# Patient Record
Sex: MALE | Race: WHITE | NOT HISPANIC OR LATINO | Employment: OTHER | URBAN - NONMETROPOLITAN AREA
[De-identification: names, ages, dates, MRNs, and addresses within clinical notes are randomized per-mention and may not be internally consistent; named-entity substitution may affect disease eponyms.]

---

## 2020-11-28 ENCOUNTER — HISTORICAL (OUTPATIENT)
Dept: ADMINISTRATIVE | Facility: HOSPITAL | Age: 70
End: 2020-11-28

## 2021-11-30 LAB
ALBUMIN SERPL-MCNC: 3.7 G/DL (ref 3.4–5)
ALBUMIN/GLOB SERPL: 1.3 {RATIO}
ALP SERPL-CCNC: 106 U/L (ref 50–144)
ALT SERPL-CCNC: 17 U/L (ref 1–45)
ANION GAP SERPL CALC-SCNC: 5 MMOL/L (ref 2–13)
AST SERPL-CCNC: 23 U/L (ref 17–59)
BASOPHILS # BLD AUTO: 0.04 10*3/UL (ref 0.01–0.08)
BASOPHILS NFR BLD AUTO: 0.7 % (ref 0.1–1.2)
BILIRUB SERPL-MCNC: 0.52 MG/DL (ref 0–1)
BUN SERPL-MCNC: 11 MG/DL (ref 7–20)
CALCIUM SERPL-MCNC: 9.3 MG/DL (ref 8.4–10.2)
CHLORIDE SERPL-SCNC: 104 MMOL/L (ref 94–110)
CO2 SERPL-SCNC: 31 MMOL/L (ref 21–32)
CREAT SERPL-MCNC: 0.84 MG/DL (ref 0.66–1.25)
CREAT/UREA NIT SERPL: 13.1 (ref 12–20)
EOSINOPHIL # BLD AUTO: 0.08 10*3/UL (ref 0.04–0.54)
EOSINOPHIL NFR BLD AUTO: 1.4 % (ref 0.7–7)
ERYTHROCYTE [DISTWIDTH] IN BLOOD BY AUTOMATED COUNT: 12.4 % (ref 11.6–14.4)
GLOBULIN SER-MCNC: 2.9 G/DL (ref 2–3.9)
GLUCOSE SERPL-MCNC: 153 MGM./DL (ref 70–115)
HCT VFR BLD AUTO: 42.5 % (ref 36–52)
HGB BLD-MCNC: 14.2 G/DL (ref 13–18)
IMM GRANULOCYTES # BLD AUTO: 0.01 10*3/UL (ref 0–0.03)
IMM GRANULOCYTES NFR BLD AUTO: 0.2 % (ref 0–0.5)
LYMPHOCYTES # BLD AUTO: 2.03 10*3/UL (ref 1.32–3.57)
LYMPHOCYTES NFR BLD AUTO: 35.4 % (ref 20–55)
MCH RBC QN AUTO: 29.3 PG (ref 27–34)
MCHC RBC AUTO-ENTMCNC: 33.4 G/DL (ref 31–37)
MCV RBC AUTO: 87.6 FL (ref 79–99)
MONOCYTES # BLD AUTO: 0.44 10*3/UL (ref 0.3–0.82)
MONOCYTES NFR BLD AUTO: 7.7 % (ref 4.7–12.5)
NEUTROPHILS # BLD AUTO: 3.14 10*3/UL (ref 1.78–5.38)
NEUTROPHILS NFR BLD AUTO: 54.6 % (ref 37–73)
PLATELET # BLD AUTO: 180 10*3/UL (ref 140–371)
PMV BLD AUTO: 12.3 FL (ref 9.4–12.4)
POTASSIUM SERPL-SCNC: 4.2 MMOL/L (ref 3.5–5.1)
PROT SERPL-MCNC: 6.6 G/DL (ref 6.3–8.2)
RBC # BLD AUTO: 4.85 10*6/UL (ref 4–6)
SODIUM SERPL-SCNC: 140 MMOL/L (ref 135–145)
WBC # SPEC AUTO: 5.7 10*3/UL (ref 4–11.5)

## 2021-12-16 LAB — CRC RECOMMENDATION EXT: NORMAL

## 2021-12-29 LAB
EST. AVERAGE GLUCOSE BLD GHB EST-MCNC: 210 MG/DL (ref 70–115)
HBA1C MFR BLD: 9 % (ref 4–6)

## 2022-04-07 ENCOUNTER — HISTORICAL (OUTPATIENT)
Dept: ADMINISTRATIVE | Facility: HOSPITAL | Age: 72
End: 2022-04-07

## 2022-04-23 VITALS
WEIGHT: 250 LBS | OXYGEN SATURATION: 98 % | SYSTOLIC BLOOD PRESSURE: 170 MMHG | BODY MASS INDEX: 37.89 KG/M2 | HEIGHT: 68 IN | DIASTOLIC BLOOD PRESSURE: 80 MMHG

## 2022-04-27 ENCOUNTER — HISTORICAL (OUTPATIENT)
Dept: ADMINISTRATIVE | Facility: HOSPITAL | Age: 72
End: 2022-04-27

## 2022-07-13 LAB
LEFT EYE DM RETINOPATHY: POSITIVE
RIGHT EYE DM RETINOPATHY: POSITIVE

## 2023-01-31 ENCOUNTER — DOCUMENTATION ONLY (OUTPATIENT)
Dept: ADMINISTRATIVE | Facility: HOSPITAL | Age: 73
End: 2023-01-31
Payer: MEDICARE

## 2023-04-18 ENCOUNTER — LAB VISIT (OUTPATIENT)
Dept: LAB | Facility: HOSPITAL | Age: 73
End: 2023-04-18
Attending: NURSE PRACTITIONER
Payer: MEDICARE

## 2023-04-18 DIAGNOSIS — E11.00 TYPE II DIABETES MELLITUS WITH HYPEROSMOLARITY, UNCONTROLLED: Primary | ICD-10-CM

## 2023-04-18 DIAGNOSIS — Z79.4 ENCOUNTER FOR LONG-TERM (CURRENT) USE OF INSULIN: ICD-10-CM

## 2023-04-18 DIAGNOSIS — I10 ESSENTIAL HYPERTENSION, MALIGNANT: ICD-10-CM

## 2023-04-18 DIAGNOSIS — E16.2 HYPOGLYCEMIA, UNSPECIFIED: ICD-10-CM

## 2023-04-18 LAB
ALBUMIN SERPL-MCNC: 4 G/DL (ref 3.4–5)
ALBUMIN/GLOB SERPL: 1.5 RATIO
ALP SERPL-CCNC: 126 UNIT/L (ref 50–144)
ALT SERPL-CCNC: 22 UNIT/L (ref 1–45)
ANION GAP SERPL CALC-SCNC: 4 MEQ/L (ref 2–13)
AST SERPL-CCNC: 25 UNIT/L (ref 17–59)
BILIRUBIN DIRECT+TOT PNL SERPL-MCNC: 0.5 MG/DL (ref 0–1)
BUN SERPL-MCNC: 13 MG/DL (ref 7–20)
CALCIUM SERPL-MCNC: 8.8 MG/DL (ref 8.4–10.2)
CHLORIDE SERPL-SCNC: 108 MMOL/L (ref 98–110)
CO2 SERPL-SCNC: 30 MMOL/L (ref 21–32)
CREAT SERPL-MCNC: 0.88 MG/DL (ref 0.66–1.25)
CREAT/UREA NIT SERPL: 15 (ref 12–20)
EST. AVERAGE GLUCOSE BLD GHB EST-MCNC: 157.1 MG/DL (ref 70–115)
GFR SERPLBLD CREATININE-BSD FMLA CKD-EPI: >90 MLS/MIN/1.73/M2
GLOBULIN SER-MCNC: 2.6 GM/DL (ref 2–3.9)
GLUCOSE SERPL-MCNC: 61 MG/DL (ref 70–115)
HBA1C MFR BLD: 7.1 % (ref 4–6)
POTASSIUM SERPL-SCNC: 4 MMOL/L (ref 3.5–5.1)
PROT SERPL-MCNC: 6.6 GM/DL (ref 6.3–8.2)
SODIUM SERPL-SCNC: 142 MMOL/L (ref 135–145)

## 2023-04-18 PROCEDURE — 80053 COMPREHEN METABOLIC PANEL: CPT

## 2023-04-18 PROCEDURE — 36415 COLL VENOUS BLD VENIPUNCTURE: CPT

## 2023-04-18 PROCEDURE — 83036 HEMOGLOBIN GLYCOSYLATED A1C: CPT

## 2023-06-22 VITALS
OXYGEN SATURATION: 96 % | DIASTOLIC BLOOD PRESSURE: 70 MMHG | BODY MASS INDEX: 35.95 KG/M2 | HEIGHT: 68 IN | SYSTOLIC BLOOD PRESSURE: 144 MMHG | HEART RATE: 90 BPM | WEIGHT: 237.19 LBS

## 2023-06-22 PROCEDURE — 83036 HEMOGLOBIN GLYCOSYLATED A1C: CPT | Performed by: NURSE PRACTITIONER

## 2023-06-22 PROCEDURE — 84443 ASSAY THYROID STIM HORMONE: CPT | Performed by: NURSE PRACTITIONER

## 2023-06-22 PROCEDURE — 80061 LIPID PANEL: CPT | Performed by: NURSE PRACTITIONER

## 2023-06-22 PROCEDURE — 85027 COMPLETE CBC AUTOMATED: CPT | Performed by: NURSE PRACTITIONER

## 2023-06-22 PROCEDURE — 80053 COMPREHEN METABOLIC PANEL: CPT | Performed by: NURSE PRACTITIONER

## 2023-06-28 ENCOUNTER — OFFICE VISIT (OUTPATIENT)
Dept: FAMILY MEDICINE | Facility: CLINIC | Age: 73
End: 2023-06-28
Payer: MEDICARE

## 2023-06-28 VITALS
HEART RATE: 56 BPM | WEIGHT: 236.19 LBS | SYSTOLIC BLOOD PRESSURE: 138 MMHG | BODY MASS INDEX: 35.79 KG/M2 | TEMPERATURE: 98 F | HEIGHT: 68 IN | DIASTOLIC BLOOD PRESSURE: 70 MMHG | OXYGEN SATURATION: 98 %

## 2023-06-28 DIAGNOSIS — Z12.5 ENCOUNTER FOR SCREENING FOR MALIGNANT NEOPLASM OF PROSTATE: ICD-10-CM

## 2023-06-28 DIAGNOSIS — Z79.4 TYPE 2 DIABETES MELLITUS WITHOUT COMPLICATION, WITH LONG-TERM CURRENT USE OF INSULIN: ICD-10-CM

## 2023-06-28 DIAGNOSIS — D51.0 PERNICIOUS ANEMIA: ICD-10-CM

## 2023-06-28 DIAGNOSIS — E11.9 TYPE 2 DIABETES MELLITUS WITHOUT COMPLICATION, WITH LONG-TERM CURRENT USE OF INSULIN: ICD-10-CM

## 2023-06-28 DIAGNOSIS — Z00.00 MEDICARE ANNUAL WELLNESS VISIT, SUBSEQUENT: Primary | ICD-10-CM

## 2023-06-28 DIAGNOSIS — E78.00 HYPERCHOLESTEROLEMIA: ICD-10-CM

## 2023-06-28 PROBLEM — Z80.0 FAMILY HISTORY OF MALIGNANT NEOPLASM OF COLON: Status: ACTIVE | Noted: 2023-06-28

## 2023-06-28 PROBLEM — Z87.891 FORMER SMOKER: Status: ACTIVE | Noted: 2023-06-28

## 2023-06-28 PROBLEM — I10 HYPERTENSION: Status: ACTIVE | Noted: 2023-06-28

## 2023-06-28 PROBLEM — G25.0 ESSENTIAL TREMOR: Status: ACTIVE | Noted: 2023-06-28

## 2023-06-28 PROCEDURE — G0439 PPPS, SUBSEQ VISIT: HCPCS | Mod: ,,, | Performed by: NURSE PRACTITIONER

## 2023-06-28 PROCEDURE — G0439 PR MEDICARE ANNUAL WELLNESS SUBSEQUENT VISIT: ICD-10-PCS | Mod: ,,, | Performed by: NURSE PRACTITIONER

## 2023-06-28 RX ORDER — INSULIN DEGLUDEC 200 U/ML
160 INJECTION, SOLUTION SUBCUTANEOUS DAILY
COMMUNITY
Start: 2021-09-01 | End: 2023-06-28 | Stop reason: SDUPTHER

## 2023-06-28 RX ORDER — INSULIN ASPART 100 [IU]/ML
50 INJECTION, SOLUTION INTRAVENOUS; SUBCUTANEOUS 3 TIMES DAILY
COMMUNITY
Start: 2010-03-01

## 2023-06-28 RX ORDER — ROSUVASTATIN CALCIUM 20 MG/1
20 TABLET, COATED ORAL DAILY
COMMUNITY
Start: 2023-05-23 | End: 2024-02-08

## 2023-06-28 RX ORDER — LISINOPRIL 20 MG/1
20 TABLET ORAL DAILY
COMMUNITY
Start: 2022-06-01 | End: 2024-02-08

## 2023-06-28 RX ORDER — DAPAGLIFLOZIN 10 MG/1
10 TABLET, FILM COATED ORAL DAILY
COMMUNITY
Start: 2022-03-01 | End: 2024-02-08

## 2023-06-28 RX ORDER — INSULIN DEGLUDEC 200 U/ML
120 INJECTION, SOLUTION SUBCUTANEOUS DAILY
Qty: 6 PEN | Refills: 11 | Status: SHIPPED | OUTPATIENT
Start: 2023-06-28

## 2023-06-28 NOTE — PROGRESS NOTES
Patient ID: González Soni  : 1950    Chief Complaint: Medicare AWV      History of Present Illness:  The patient is a 73 y.o. White male who presents to clinic for annual wellness visit.      Doing well in general. He remains active in life and tolerates activity without complaint.     Very compliant with his diabetes management; established with endocrinology. His A1c is now 7.2%.     Denies changes in bowel patterns, no melena, hematochezia, rectal pain, rectal bleeding, or changes in caliber of stool. Colonoscopy done 2021. Recommendation to repeat Q5 years due to fly hx colon cancer.   Denies issues with urination; no hesitancy, frequency, difficulty with start or stop of stream.        Allergies: Patient has No Known Allergies.     Past Medical History:  has a past medical history of Diabetes mellitus, type 2, Essential tremor, Family history of colon cancer, Hypercholesterolemia, Hypertension, and Pernicious anemia.    Surgical History:  has a past surgical history that includes Colonoscopy (2021); Cataract extraction (Right, 2021); Cataract extraction (Left, 2021); retina rupture repair (Left, ); Eye surgery (); and Fracture surgery ().    Family History: family history includes Cancer in his father, maternal grandfather, and mother; Colon cancer in his mother; Diabetes in his mother; Heart disease in his father; Prostate cancer in his father.    Social History:  reports that he quit smoking about 9 years ago. His smoking use included cigarettes, pipe, and cigars. He started smoking about 48 years ago. He has a 15.00 pack-year smoking history. He has never used smokeless tobacco. He reports that he does not currently use alcohol after a past usage of about 3.0 standard drinks per week. He reports that he does not use drugs.    Care Team: Patient Care Team:  MIRANDA Quick as PCP - General (Family Medicine)     Current Medications:  Current  Outpatient Medications   Medication Instructions    dapagliflozin propanediol (FARXIGA) 10 mg, Oral, Daily    insulin aspart U-100 (NOVOLOG FLEXPEN U-100 INSULIN) 50 Units, Subcutaneous, 3 times daily    insulin degludec (TRESIBA FLEXTOUCH U-200) 120 Units, Subcutaneous, Daily    lisinopriL (PRINIVIL,ZESTRIL) 20 mg, Oral, Daily    rosuvastatin (CRESTOR) 20 mg, Oral, Daily       Opioid Screening: Patient medication list reviewed, patient is not taking prescription opioids. Patient is not using additional opioids than prescribed. Patient is at low risk of substance abuse based on this opioid use history.     Patient Reported Health Risk Assessment  What [age] 70-79  Are you male or female?: Male  During the past four weeks, how much have you been bothered by emotional problems such as feeling anxious, depressed, irritable, sad, or downhearted and blue?: Not at all  During the past five weeks, has your physical and/or emotional health limited your social activities with family, friends, neighbors, or groups?: Not at all  During the past four weeks, how much bodily pain have you generally had?: Mild pain  During the past four weeks, was someone available to help if you needed and wanted help?: Yes, as much as I wanted  During the past four weeks, what was the hardest physical activity you could do for at least two minutes?: Heavy  Can you get to places out of walking distance without help?  (For example, can you travel alone on buses or taxis, or drive your own car?): Yes  Can you go shopping for groceries or clothes without someone's help?: Yes  Can you prepare your own meals?: Yes  Can you do your own housework without help?: Yes  Because of any health problems, do you need the help of another person with your personal care needs such as eating, bathing, dressing, or getting around the house?: No  Can you handle your own money without help?: Yes  During the past four weeks, how would you rate your health in  general?: Very good  How have things been going for you during the past four weeks?: Very well  Are you having difficulties driving your car?: No  Do you always fasten your seat belt when you are in a car?: Yes, usually  How often in the past four weeks have you been bothered by falling or dizzy when standing up?: Never  How often in the past four weeks have you been bothered by sexual problems?: Never  How often in the past four weeks have you been bothered by trouble eating well?: Never  How often in the past four weeks have you been bothered by teeth or denture problems?: Never  How often in the past four weeks have you been bothered with problems using the telephone?: Never  How often in the past four weeks have you been bothered by tiredness or fatigue?: Sometimes  Have you fallen two or more times in the past year?: No  Are you afraid of falling?: No  Are you a smoker?: No  During the past four weeks, how many drinks of wine, beer, or other alcoholic beverages did you have?: No alcohol at all  Do you exercise for about 20 minutes three or more days a week?: Yes, most of the time  Have you been given any information to help you with hazards in your house that might hurt you?: Yes  Have you been given any information to help you with keeping track of your medications?: Yes  How often do you have trouble taking medicines the way you've been told to take them?: I always take them as prescribed  How confident are you that you can control and manage most of your health problems?: Very confident  What is your race? (Check all that apply.):     Review of Systems   Constitutional:  Negative for activity change, appetite change, fatigue and unexpected weight change.   HENT:  Negative for ear pain and hearing loss.    Eyes:  Negative for visual disturbance.   Respiratory:  Negative for apnea, cough, chest tightness, shortness of breath and wheezing.    Cardiovascular:  Negative for chest pain, palpitations, leg  "swelling and claudication.   Gastrointestinal:  Negative for abdominal pain, anal bleeding, blood in stool, change in bowel habit, nausea, vomiting, reflux and change in bowel habit.   Endocrine: Negative for cold intolerance, heat intolerance, polydipsia, polyphagia and polyuria.   Genitourinary:  Negative for dysuria, frequency, hematuria and urgency.   Musculoskeletal:  Negative for arthralgias.   Integumentary:  Negative for rash and mole/lesion.   Allergic/Immunologic: Negative for environmental allergies.   Neurological:  Negative for dizziness, headaches and memory loss.        Visit Vitals  /70 (BP Location: Left arm, Patient Position: Sitting)   Pulse (!) 56   Temp 97.7 °F (36.5 °C) (Oral)   Ht 5' 7.72" (1.72 m)   Wt 107.1 kg (236 lb 3.2 oz)   SpO2 98%   BMI 36.21 kg/m²       Physical Exam  Vitals reviewed.   Constitutional:       Appearance: Normal appearance. He is normal weight.   HENT:      Right Ear: Tympanic membrane and ear canal normal.      Left Ear: Tympanic membrane and ear canal normal.   Eyes:      Conjunctiva/sclera: Conjunctivae normal.   Cardiovascular:      Rate and Rhythm: Normal rate and regular rhythm.      Pulses: Normal pulses.      Heart sounds: Normal heart sounds.   Pulmonary:      Effort: Pulmonary effort is normal.      Breath sounds: Normal breath sounds.   Abdominal:      General: Bowel sounds are normal.      Palpations: Abdomen is soft.   Musculoskeletal:         General: Normal range of motion.      Cervical back: Normal range of motion and neck supple.      Right lower leg: Edema (mild) present.      Left lower leg: Edema (mild) present.   Skin:     General: Skin is warm and dry.      Capillary Refill: Capillary refill takes less than 2 seconds.      Comments: +mild varicose veins bilateral legs.    Neurological:      Mental Status: He is alert and oriented to person, place, and time.   Psychiatric:         Mood and Affect: Mood normal.         Behavior: Behavior " normal.        No flowsheet data found.  No flowsheet data found.        Depression Screening  Over the past two weeks, has the patient felt down, depressed, or hopeless?: No  Over the past two weeks, has the patient felt little interest or pleasure in doing things?: No  Functional Ability/Safety Screening  Was the patient's timed Up & Go test unsteady or longer than 30 seconds?: No  Does the patient need help with phone, transportation, shopping, preparing meals, housework, laundry, meds, or managing money?: No  Does the patient's home have rugs in the hallway, lack grab bars in the bathroom, lack handrails on the stairs or have poor lighting?: No  Have you noticed any hearing difficulties?: No  Cognitive Function (Assessed through direct observation with due consideration of information obtained by way of patient reports and/or concerns raised by family, friends, caretakers, or others)    Does the patient repeat questions/statements in the same day?: No  Does the patient have trouble remembering the date, year, and time?: No  Does the patient have difficulty managing finances?: No  Does the patient have a decreased sense of direction?: No    Labs Reviewed:  Chemistry:  Lab Results   Component Value Date     06/22/2023    K 3.7 06/22/2023    CHLORIDE 105 06/22/2023    BUN 9.0 06/22/2023    CREATININE 0.92 06/22/2023    EGFRNORACEVR 88 06/22/2023    GLUCOSE 70 06/22/2023    CALCIUM 8.2 (L) 06/22/2023    ALKPHOS 156 (H) 06/22/2023    LABPROT 6.2 (L) 06/22/2023    ALBUMIN 3.6 06/22/2023    AST 39 06/22/2023    ALT 38 06/22/2023    TSH 2.310 06/22/2023        Lab Results   Component Value Date    HGBA1C 7.2 (H) 06/22/2023        Hematology:  Lab Results   Component Value Date    WBC 6.94 06/22/2023    RBC 4.80 06/22/2023    HGB 14.4 06/22/2023    HCT 41.5 06/22/2023    MCV 86.5 06/22/2023    MCH 30.0 06/22/2023    MCHC 34.7 06/22/2023    RDW 12.7 06/22/2023     06/22/2023    MPV 11.6 06/22/2023       Lipid  Panel:  Lab Results   Component Value Date    CHOL 68 06/22/2023    HDL 24 (L) 06/22/2023    DLDL <30.0 (L) 06/22/2023    TRIG 99 06/22/2023        Assessment & Plan:  1. Medicare annual wellness visit, subsequent  -     CBC Auto Differential; Future; Expected date: 06/28/2024  -     Comprehensive Metabolic Panel; Future; Expected date: 06/28/2024  -     Lipid Panel; Future; Expected date: 06/28/2024  -     TSH; Future; Expected date: 06/28/2024  -     Hemoglobin A1C; Future; Expected date: 06/28/2024  -     Microalbumin/Creatinine Ratio, Urine; Future; Expected date: 06/28/2024  -     PSA, Screening; Future; Expected date: 06/28/2024    2. Type 2 diabetes mellitus without complication, with long-term current use of insulin  Overview:  On Farxiga, Tresiba, and Novolin @ mealtime > per Endocrinology    Diabetes labs:   Lab Results   Component Value Date    HGBA1C 7.2 (H) 06/22/2023   Continue medications per Endocrinology.  Take all medications as directed; compliance is critical for managing your diabetes.   Follow American Diabetic Association (ADA) dietary guidelines for eating and implement exercise into your daily regimen.   Check your glucose levels each morning and record for review at follow up.    3. Hypercholesterolemia  Overview:  On Rosuvastatin 20 mg daily.  Lipid Panel:  Lab Results   Component Value Date    CHOL 68 06/22/2023    HDL 24 (L) 06/22/2023    DLDL <30.0 (L) 06/22/2023    TRIG 99 06/22/2023     Continue Rosuvastatin 20 mg daily.   LDL Goal: 100  Educated on dietary modifications. Follow a low cholesterol, low saturated fat diet with less that 200mg of cholesterol a day.  Avoid fried foods and high saturated fats.  Increase dietary fiber.  Regular exercise can reduce LDL (bad cholesterol) and raise HDL (good cholesterol). Encourage physical activity 5 times per week for 30 minutes per day.     4. Pernicious anemia  H&H 14.4/41.5    5. Encounter for screening for malignant neoplasm of  prostate  -     PSA, Screening; Future; Expected date: 06/28/2024           Colon Cancer Screening -  Colonoscopy done 12/2021. Recommendation to repeat Q5 years due to fly hx colon cancer. Due 2026.  Eye Exam- Recommend annually.  Dental Exam- Recommend biannually.    Vaccinations:  Immunization History   Administered Date(s) Administered    COVID-19 Vaccine 01/27/2021, 02/26/2021, 10/25/2021, 05/24/2022    COVID-19, MRNA, LN-S, PF (MODERNA FULL 0.5 ML DOSE) 01/27/2021, 02/26/2021, 10/25/2021, 05/24/2022    COVID-19, mRNA, LNP-S, bivalent booster, PF (PFIZER OMICRON) 10/05/2022    Influenza - Quadrivalent - High Dose - PF (65 years and older) 09/28/2020, 10/06/2021, 10/05/2022    Pneumococcal Conjugate - 13 Valent 10/26/2021    Pneumococcal Polysaccharide - 23 Valent 12/20/2022    Tdap 11/28/2020       Future Appointments   Date Time Provider Department Center   6/25/2024  8:00 AM LAB, [facility] LABORATORY DRAW STATION [facility] [place]   7/2/2024  9:00 [facility] [place]         Follow up in about 1 year (around 6/28/2024), or if symptoms worsen or fail to improve, for wellness, labs prior. Call sooner if needed.    MIRANDA Santos        Medicare Annual Wellness and Personalized Prevention Plan:   Fall Risk + Home Safety + Hearing Impairment + Depression Screen + Opioid and Substance Abuse Screening + Cognitive Impairment Screen + Health Risk Assessment all reviewed.     Health Maintenance Topics with due status: Not Due       Topic Last Completion Date    TETANUS VACCINE 11/28/2020    Colorectal Cancer Screening 12/16/2021    Low Dose Statin 05/23/2023    Lipid Panel 06/22/2023    Hemoglobin A1c 06/22/2023      The patient's Health Maintenance was reviewed and the following appears to be due at this time:   Health Maintenance Due   Topic Date Due    Hepatitis C Screening  Never done    Diabetes Urine Screening  Never done    Foot Exam  Never done    Shingles Vaccine (1  of 2) Never done    Abdominal Aortic Aneurysm Screening  Never done    Eye Exam  07/13/2023       Advance Care Planning   I attest to discussing Advance Care Planning with patient and/or family member.  Education was provided including the importance of the Health Care Power of , Advance Directives, and/or [place] documentation.  The patient expressed understanding to the importance of this information and discussion.       Follow up in about 1 year (around 6/28/2024), or if symptoms worsen or fail to improve, for wellness, labs prior. In addition to their scheduled follow up, the patient has also been instructed to follow up on as needed basis.

## 2023-08-15 ENCOUNTER — LAB VISIT (OUTPATIENT)
Dept: LAB | Facility: HOSPITAL | Age: 73
End: 2023-08-15
Attending: INTERNAL MEDICINE
Payer: MEDICARE

## 2023-08-15 DIAGNOSIS — E11.00 TYPE II DIABETES MELLITUS WITH HYPEROSMOLARITY, UNCONTROLLED: Primary | ICD-10-CM

## 2023-08-15 DIAGNOSIS — E16.2 HYPOGLYCEMIA, UNSPECIFIED: ICD-10-CM

## 2023-08-15 DIAGNOSIS — E78.5 HYPERLIPIDEMIA, UNSPECIFIED HYPERLIPIDEMIA TYPE: ICD-10-CM

## 2023-08-15 LAB
ALBUMIN SERPL-MCNC: 4.3 G/DL (ref 3.4–5)
ALBUMIN/GLOB SERPL: 1.5 RATIO
ALP SERPL-CCNC: 130 UNIT/L (ref 50–144)
ALT SERPL-CCNC: 19 UNIT/L (ref 1–45)
ANION GAP SERPL CALC-SCNC: 9 MEQ/L (ref 2–13)
AST SERPL-CCNC: 22 UNIT/L (ref 17–59)
BILIRUB SERPL-MCNC: 0.6 MG/DL (ref 0–1)
BUN SERPL-MCNC: 19 MG/DL (ref 7–20)
CALCIUM SERPL-MCNC: 9.7 MG/DL (ref 8.4–10.2)
CHLORIDE SERPL-SCNC: 101 MMOL/L (ref 98–110)
CHOLEST SERPL-MCNC: 178 MG/DL (ref 0–200)
CO2 SERPL-SCNC: 27 MMOL/L (ref 21–32)
CREAT SERPL-MCNC: 1.01 MG/DL (ref 0.66–1.25)
CREAT/UREA NIT SERPL: 19 (ref 12–20)
EST. AVERAGE GLUCOSE BLD GHB EST-MCNC: 185.8 MG/DL (ref 70–115)
GFR SERPLBLD CREATININE-BSD FMLA CKD-EPI: 79 MLS/MIN/1.73/M2
GLOBULIN SER-MCNC: 2.9 GM/DL (ref 2–3.9)
GLUCOSE SERPL-MCNC: 86 MG/DL (ref 70–115)
HBA1C MFR BLD: 8.1 % (ref 4–6)
HDLC SERPL-MCNC: 46 MG/DL (ref 40–60)
LDLC SERPL DIRECT ASSAY-SCNC: 97.4 MG/DL (ref 30–100)
POTASSIUM SERPL-SCNC: 5 MMOL/L (ref 3.5–5.1)
PROT SERPL-MCNC: 7.2 GM/DL (ref 6.3–8.2)
SODIUM SERPL-SCNC: 137 MMOL/L (ref 135–145)
TRIGL SERPL-MCNC: 116 MG/DL (ref 30–200)

## 2023-08-15 PROCEDURE — 80061 LIPID PANEL: CPT

## 2023-08-15 PROCEDURE — 36415 COLL VENOUS BLD VENIPUNCTURE: CPT

## 2023-08-15 PROCEDURE — 83036 HEMOGLOBIN GLYCOSYLATED A1C: CPT

## 2023-08-15 PROCEDURE — 80053 COMPREHEN METABOLIC PANEL: CPT

## 2023-08-17 DIAGNOSIS — Z79.4 TYPE 2 DIABETES MELLITUS WITHOUT COMPLICATION, WITH LONG-TERM CURRENT USE OF INSULIN: Primary | ICD-10-CM

## 2023-08-17 DIAGNOSIS — E11.9 TYPE 2 DIABETES MELLITUS WITHOUT COMPLICATION, WITH LONG-TERM CURRENT USE OF INSULIN: Primary | ICD-10-CM

## 2023-08-17 RX ORDER — BLOOD SUGAR DIAGNOSTIC
STRIP MISCELLANEOUS
Qty: 150 STRIP | Refills: 11 | Status: SHIPPED | OUTPATIENT
Start: 2023-08-17

## 2024-02-08 DIAGNOSIS — I10 HYPERTENSION, UNSPECIFIED TYPE: ICD-10-CM

## 2024-02-08 DIAGNOSIS — Z79.4 TYPE 2 DIABETES MELLITUS WITHOUT COMPLICATION, WITH LONG-TERM CURRENT USE OF INSULIN: ICD-10-CM

## 2024-02-08 DIAGNOSIS — E78.00 HYPERCHOLESTEROLEMIA: Primary | ICD-10-CM

## 2024-02-08 DIAGNOSIS — E11.9 TYPE 2 DIABETES MELLITUS WITHOUT COMPLICATION, WITH LONG-TERM CURRENT USE OF INSULIN: ICD-10-CM

## 2024-02-08 RX ORDER — DAPAGLIFLOZIN 10 MG/1
10 TABLET, FILM COATED ORAL EVERY MORNING
Qty: 90 TABLET | Refills: 3 | Status: SHIPPED | OUTPATIENT
Start: 2024-02-08

## 2024-02-08 RX ORDER — ROSUVASTATIN CALCIUM 20 MG/1
20 TABLET, COATED ORAL
Qty: 90 TABLET | Refills: 3 | Status: SHIPPED | OUTPATIENT
Start: 2024-02-08

## 2024-02-08 RX ORDER — LISINOPRIL 20 MG/1
20 TABLET ORAL
Qty: 90 TABLET | Refills: 3 | Status: SHIPPED | OUTPATIENT
Start: 2024-02-08

## 2024-02-14 ENCOUNTER — LAB VISIT (OUTPATIENT)
Dept: LAB | Facility: HOSPITAL | Age: 74
End: 2024-02-14
Attending: INTERNAL MEDICINE
Payer: MEDICARE

## 2024-02-14 DIAGNOSIS — E11.00 TYPE II DIABETES MELLITUS WITH HYPEROSMOLARITY, UNCONTROLLED: Primary | ICD-10-CM

## 2024-02-14 DIAGNOSIS — Z79.4 ENCOUNTER FOR LONG-TERM (CURRENT) USE OF INSULIN: ICD-10-CM

## 2024-02-14 DIAGNOSIS — E16.2 HYPOGLYCEMIA, UNSPECIFIED: ICD-10-CM

## 2024-02-14 LAB
ALBUMIN SERPL-MCNC: 4.1 G/DL (ref 3.4–5)
ALBUMIN/GLOB SERPL: 1.3 RATIO
ALP SERPL-CCNC: 124 UNIT/L (ref 50–144)
ALT SERPL-CCNC: 28 UNIT/L (ref 1–45)
ANION GAP SERPL CALC-SCNC: 7 MEQ/L (ref 2–13)
AST SERPL-CCNC: 30 UNIT/L (ref 17–59)
BILIRUB SERPL-MCNC: 0.7 MG/DL (ref 0–1)
BUN SERPL-MCNC: 13 MG/DL (ref 7–20)
CALCIUM SERPL-MCNC: 9.5 MG/DL (ref 8.4–10.2)
CHLORIDE SERPL-SCNC: 105 MMOL/L (ref 98–110)
CHOLEST SERPL-MCNC: 154 MG/DL (ref 0–200)
CO2 SERPL-SCNC: 29 MMOL/L (ref 21–32)
CREAT SERPL-MCNC: 0.99 MG/DL (ref 0.66–1.25)
CREAT UR-MCNC: 63.5 MG/DL (ref 63–166)
CREAT/UREA NIT SERPL: 13 (ref 12–20)
EST. AVERAGE GLUCOSE BLD GHB EST-MCNC: 211.6 MG/DL (ref 70–115)
GFR SERPLBLD CREATININE-BSD FMLA CKD-EPI: 80 MLS/MIN/1.73/M2
GLOBULIN SER-MCNC: 3.2 GM/DL (ref 2–3.9)
GLUCOSE SERPL-MCNC: 141 MG/DL (ref 70–115)
HBA1C MFR BLD: 9 % (ref 4–6)
HDLC SERPL-MCNC: 44 MG/DL (ref 40–60)
LDLC SERPL DIRECT ASSAY-SCNC: 81.3 MG/DL (ref 30–100)
MAGNESIUM SERPL-MCNC: 2.3 MG/DL (ref 1.8–2.4)
MICROALBUMIN UR-MCNC: <5 UG/ML
MICROALBUMIN/CREAT RATIO PNL UR: NORMAL
POTASSIUM SERPL-SCNC: 4.4 MMOL/L (ref 3.5–5.1)
PROT SERPL-MCNC: 7.3 GM/DL (ref 6.3–8.2)
SODIUM SERPL-SCNC: 141 MMOL/L (ref 135–145)
T4 FREE SERPL-MCNC: 0.98 NG/DL (ref 0.78–2.19)
TRIGL SERPL-MCNC: 138 MG/DL (ref 30–200)
TSH SERPL-ACNC: 2.95 UIU/ML (ref 0.36–3.74)

## 2024-02-14 PROCEDURE — 83735 ASSAY OF MAGNESIUM: CPT

## 2024-02-14 PROCEDURE — 84439 ASSAY OF FREE THYROXINE: CPT

## 2024-02-14 PROCEDURE — 36415 COLL VENOUS BLD VENIPUNCTURE: CPT

## 2024-02-14 PROCEDURE — 84443 ASSAY THYROID STIM HORMONE: CPT

## 2024-02-14 PROCEDURE — 83036 HEMOGLOBIN GLYCOSYLATED A1C: CPT

## 2024-02-14 PROCEDURE — 80053 COMPREHEN METABOLIC PANEL: CPT

## 2024-02-14 PROCEDURE — 80061 LIPID PANEL: CPT

## 2024-02-14 PROCEDURE — 82043 UR ALBUMIN QUANTITATIVE: CPT

## 2024-05-15 LAB
LEFT EYE DM RETINOPATHY: POSITIVE
RIGHT EYE DM RETINOPATHY: POSITIVE

## 2024-06-04 ENCOUNTER — PATIENT OUTREACH (OUTPATIENT)
Facility: CLINIC | Age: 74
End: 2024-06-04
Payer: MEDICARE

## 2024-06-04 NOTE — PROGRESS NOTES
Health Maintenance Topic(s) Outreach Outcomes & Actions Taken:    Eye Exam - Outreach Outcomes & Actions Taken  : External Records Requested & Care Team Updated if Applicable

## 2024-06-04 NOTE — LETTER
AUTHORIZATION FOR RELEASE OF   CONFIDENTIAL INFORMATION    Dear Dr. Chinchilla,    Fax: [phone]    We are seeing González Soni, date of birth 1950, in the clinic at [facility]. Naina Spear, MIRANDA is the patient's PCP. González Soni has an outstanding lab/procedure at the time we reviewed his chart. In order to help keep his health information updated, he has authorized us to request the following medical record(s):        (  )  MAMMOGRAM                                      (  )  COLONOSCOPY      (  )  PAP SMEAR                                          (  )  OUTSIDE LAB RESULTS     (  )  DEXA SCAN                                          ( x )  DIABETIC EYE EXAM            (  )  FOOT EXAM                                          (  )  ENTIRE RECORD     (  )  OUTSIDE IMMUNIZATIONS                 (  )  _______________         Please fax records to [facility], [place], Deana L., BERRY-C, [phone].            Patient Name: González Soni  : 1950  Patient Phone #: [phone]

## 2024-06-17 NOTE — PROGRESS NOTES
Records Received, hyper-linked into chart at this time. The following record(s)  below were uploaded for Health Maintenance .             5/15/2024 DM EYE EXAM

## 2024-06-25 PROCEDURE — 84153 ASSAY OF PSA TOTAL: CPT | Performed by: NURSE PRACTITIONER

## 2024-06-25 PROCEDURE — 82043 UR ALBUMIN QUANTITATIVE: CPT | Performed by: NURSE PRACTITIONER

## 2024-06-25 PROCEDURE — 80061 LIPID PANEL: CPT | Performed by: NURSE PRACTITIONER

## 2024-06-25 PROCEDURE — 84443 ASSAY THYROID STIM HORMONE: CPT | Performed by: NURSE PRACTITIONER

## 2024-06-25 PROCEDURE — 80053 COMPREHEN METABOLIC PANEL: CPT | Performed by: NURSE PRACTITIONER

## 2024-06-25 PROCEDURE — 82570 ASSAY OF URINE CREATININE: CPT | Performed by: NURSE PRACTITIONER

## 2024-06-25 PROCEDURE — 83036 HEMOGLOBIN GLYCOSYLATED A1C: CPT | Performed by: NURSE PRACTITIONER

## 2024-06-25 PROCEDURE — 85025 COMPLETE CBC W/AUTO DIFF WBC: CPT | Performed by: NURSE PRACTITIONER

## 2024-07-02 ENCOUNTER — TELEPHONE (OUTPATIENT)
Dept: FAMILY MEDICINE | Facility: CLINIC | Age: 74
End: 2024-07-02

## 2024-07-02 NOTE — TELEPHONE ENCOUNTER
She said that he has never had vertigo before and there was no way that she could have gotten him here for his wellness appt. She said that when he gets up out of the bed, he gets nauseous. He vomited once but she had zofran so that stopped. She wants to know if there is a rx that can be sent for it.     She also asked about his wellness. It was rescheduled for September. She wanted to know if he will need to repeat his labs.

## 2024-07-02 NOTE — TELEPHONE ENCOUNTER
I called and notified Kathleen. She verbalized understanding. She has Zyrtec so she will try it and see if it helps. If not or if it gets worse, she will take him to the ER.

## 2024-07-02 NOTE — TELEPHONE ENCOUNTER
Dizziness can be something in association with a URI like a sinus infection, dehydration, or inner ear issues but it can also be a warning sign of something more serious. This description of dizziness makes me a little bit concerned for him. Profound dizziness can indicate something serious like a stroke. If this is persisting he needs to be evaluated at an ER.     In the meantime if he has an antihistamine, like Zyrtec or Hydroxyzine at home he can try that to see if the dizziness resolves/improves.

## 2024-07-03 ENCOUNTER — HOSPITAL ENCOUNTER (INPATIENT)
Facility: HOSPITAL | Age: 74
LOS: 1 days | Discharge: HOME | DRG: 093 | End: 2024-07-04
Attending: INTERNAL MEDICINE | Admitting: FAMILY MEDICINE
Payer: MEDICARE

## 2024-07-03 DIAGNOSIS — R29.818 ACUTE FOCAL NEUROLOGICAL DEFICIT: ICD-10-CM

## 2024-07-03 DIAGNOSIS — R26.81 UNSTEADY GAIT WHEN WALKING: ICD-10-CM

## 2024-07-03 DIAGNOSIS — R42 DIZZINESS: Primary | ICD-10-CM

## 2024-07-03 LAB
ALBUMIN SERPL-MCNC: 4 G/DL (ref 3.4–5)
ALBUMIN/GLOB SERPL: 1.3 RATIO
ALP SERPL-CCNC: 109 UNIT/L (ref 50–144)
ALT SERPL-CCNC: 18 UNIT/L (ref 1–45)
ANION GAP SERPL CALC-SCNC: 10 MEQ/L (ref 2–13)
AST SERPL-CCNC: 21 UNIT/L (ref 17–59)
BASOPHILS # BLD AUTO: 0.02 X10(3)/MCL (ref 0.01–0.08)
BASOPHILS NFR BLD AUTO: 0.3 % (ref 0.1–1.2)
BILIRUB SERPL-MCNC: 0.7 MG/DL (ref 0–1)
BILIRUB UR QL STRIP.AUTO: NEGATIVE
BNP BLD-MCNC: 73.6 PG/ML (ref 0–124.9)
BUN SERPL-MCNC: 15 MG/DL (ref 7–20)
CALCIUM SERPL-MCNC: 8.6 MG/DL (ref 8.4–10.2)
CHLORIDE SERPL-SCNC: 104 MMOL/L (ref 98–110)
CHOLEST SERPL-MCNC: 154 MG/DL (ref 0–200)
CLARITY UR: CLEAR
CO2 SERPL-SCNC: 24 MMOL/L (ref 21–32)
COLOR UR AUTO: YELLOW
CREAT SERPL-MCNC: 0.93 MG/DL (ref 0.66–1.25)
CREAT/UREA NIT SERPL: 16 (ref 12–20)
EOSINOPHIL # BLD AUTO: 0.04 X10(3)/MCL (ref 0.04–0.54)
EOSINOPHIL NFR BLD AUTO: 0.6 % (ref 0.7–7)
ERYTHROCYTE [DISTWIDTH] IN BLOOD BY AUTOMATED COUNT: 12.8 %
GFR SERPLBLD CREATININE-BSD FMLA CKD-EPI: 87 ML/MIN/1.73/M2
GLOBULIN SER-MCNC: 3.1 GM/DL (ref 2–3.9)
GLUCOSE SERPL-MCNC: 276 MG/DL (ref 70–115)
GLUCOSE UR QL STRIP: >=1000
HCT VFR BLD AUTO: 46.3 % (ref 36–52)
HDLC SERPL-MCNC: 38 MG/DL (ref 40–60)
HGB BLD-MCNC: 15.6 G/DL (ref 13–18)
HGB UR QL STRIP: NEGATIVE
IMM GRANULOCYTES # BLD AUTO: 0.03 X10(3)/MCL (ref 0–0.03)
IMM GRANULOCYTES NFR BLD AUTO: 0.4 % (ref 0–0.5)
INR PPP: 1
KETONES UR QL STRIP: ABNORMAL
LDLC SERPL DIRECT ASSAY-SCNC: 87.7 MG/DL (ref 30–100)
LEUKOCYTE ESTERASE UR QL STRIP: NEGATIVE
LYMPHOCYTES # BLD AUTO: 1.77 X10(3)/MCL (ref 1.32–3.57)
LYMPHOCYTES NFR BLD AUTO: 24.5 % (ref 20–55)
MAGNESIUM SERPL-MCNC: 2.3 MG/DL (ref 1.8–2.4)
MCH RBC QN AUTO: 31.1 PG (ref 27–34)
MCHC RBC AUTO-ENTMCNC: 33.7 G/DL (ref 31–37)
MCV RBC AUTO: 92.2 FL (ref 79–99)
MONOCYTES # BLD AUTO: 0.4 X10(3)/MCL (ref 0.3–0.82)
MONOCYTES NFR BLD AUTO: 5.5 % (ref 4.7–12.5)
NEUTROPHILS # BLD AUTO: 4.95 X10(3)/MCL (ref 1.78–5.38)
NEUTROPHILS NFR BLD AUTO: 68.7 % (ref 37–73)
NITRITE UR QL STRIP: NEGATIVE
NRBC BLD AUTO-RTO: 0 %
PH UR STRIP: 6 [PH]
PLATELET # BLD AUTO: 158 X10(3)/MCL (ref 140–371)
PMV BLD AUTO: 11.8 FL (ref 9.4–12.4)
POCT GLUCOSE: 167 MG/DL (ref 70–110)
POCT GLUCOSE: 274 MG/DL (ref 70–110)
POTASSIUM SERPL-SCNC: 4.2 MMOL/L (ref 3.5–5.1)
PROT SERPL-MCNC: 7.1 GM/DL (ref 6.3–8.2)
PROT UR QL STRIP: NEGATIVE
PROTHROMBIN TIME: 10 SECONDS (ref 9.3–11.9)
RBC # BLD AUTO: 5.02 X10(6)/MCL (ref 4–6)
SODIUM SERPL-SCNC: 138 MMOL/L (ref 136–145)
SP GR UR STRIP.AUTO: 1.01 (ref 1–1.03)
TRIGL SERPL-MCNC: 140 MG/DL (ref 30–200)
TROPONIN I SERPL-MCNC: <0.012 NG/ML (ref 0–0.03)
TSH SERPL-ACNC: 0.88 UIU/ML (ref 0.36–3.74)
UROBILINOGEN UR STRIP-ACNC: 0.2
WBC # BLD AUTO: 7.21 X10(3)/MCL (ref 4–11.5)

## 2024-07-03 PROCEDURE — 93010 ELECTROCARDIOGRAM REPORT: CPT | Mod: ,,, | Performed by: INTERNAL MEDICINE

## 2024-07-03 PROCEDURE — 85610 PROTHROMBIN TIME: CPT | Performed by: INTERNAL MEDICINE

## 2024-07-03 PROCEDURE — 81003 URINALYSIS AUTO W/O SCOPE: CPT | Performed by: FAMILY MEDICINE

## 2024-07-03 PROCEDURE — 99285 EMERGENCY DEPT VISIT HI MDM: CPT | Mod: 25

## 2024-07-03 PROCEDURE — 85025 COMPLETE CBC W/AUTO DIFF WBC: CPT | Performed by: INTERNAL MEDICINE

## 2024-07-03 PROCEDURE — 94761 N-INVAS EAR/PLS OXIMETRY MLT: CPT

## 2024-07-03 PROCEDURE — 96374 THER/PROPH/DIAG INJ IV PUSH: CPT

## 2024-07-03 PROCEDURE — 63600175 PHARM REV CODE 636 W HCPCS: Performed by: INTERNAL MEDICINE

## 2024-07-03 PROCEDURE — 11000001 HC ACUTE MED/SURG PRIVATE ROOM

## 2024-07-03 PROCEDURE — 80061 LIPID PANEL: CPT | Performed by: INTERNAL MEDICINE

## 2024-07-03 PROCEDURE — 21400001 HC TELEMETRY ROOM

## 2024-07-03 PROCEDURE — 25000003 PHARM REV CODE 250: Performed by: INTERNAL MEDICINE

## 2024-07-03 PROCEDURE — 84484 ASSAY OF TROPONIN QUANT: CPT | Performed by: INTERNAL MEDICINE

## 2024-07-03 PROCEDURE — 80053 COMPREHEN METABOLIC PANEL: CPT | Performed by: INTERNAL MEDICINE

## 2024-07-03 PROCEDURE — 93005 ELECTROCARDIOGRAM TRACING: CPT

## 2024-07-03 PROCEDURE — 84443 ASSAY THYROID STIM HORMONE: CPT | Performed by: INTERNAL MEDICINE

## 2024-07-03 PROCEDURE — 63600175 PHARM REV CODE 636 W HCPCS: Performed by: FAMILY MEDICINE

## 2024-07-03 PROCEDURE — 83735 ASSAY OF MAGNESIUM: CPT | Performed by: INTERNAL MEDICINE

## 2024-07-03 PROCEDURE — 25000003 PHARM REV CODE 250: Performed by: FAMILY MEDICINE

## 2024-07-03 PROCEDURE — 83880 ASSAY OF NATRIURETIC PEPTIDE: CPT | Performed by: INTERNAL MEDICINE

## 2024-07-03 RX ORDER — ATORVASTATIN CALCIUM 20 MG/1
20 TABLET, FILM COATED ORAL NIGHTLY
Status: DISCONTINUED | OUTPATIENT
Start: 2024-07-03 | End: 2024-07-04 | Stop reason: HOSPADM

## 2024-07-03 RX ORDER — TALC
6 POWDER (GRAM) TOPICAL NIGHTLY PRN
Status: DISCONTINUED | OUTPATIENT
Start: 2024-07-03 | End: 2024-07-04 | Stop reason: HOSPADM

## 2024-07-03 RX ORDER — INSULIN ASPART 100 [IU]/ML
0-10 INJECTION, SOLUTION INTRAVENOUS; SUBCUTANEOUS
Status: DISCONTINUED | OUTPATIENT
Start: 2024-07-03 | End: 2024-07-04 | Stop reason: HOSPADM

## 2024-07-03 RX ORDER — ONDANSETRON HYDROCHLORIDE 2 MG/ML
4 INJECTION, SOLUTION INTRAVENOUS
Status: COMPLETED | OUTPATIENT
Start: 2024-07-03 | End: 2024-07-03

## 2024-07-03 RX ORDER — ENOXAPARIN SODIUM 100 MG/ML
40 INJECTION SUBCUTANEOUS EVERY 24 HOURS
Status: DISCONTINUED | OUTPATIENT
Start: 2024-07-03 | End: 2024-07-04 | Stop reason: HOSPADM

## 2024-07-03 RX ORDER — IBUPROFEN 200 MG
16 TABLET ORAL
Status: DISCONTINUED | OUTPATIENT
Start: 2024-07-03 | End: 2024-07-04 | Stop reason: HOSPADM

## 2024-07-03 RX ORDER — LISINOPRIL 10 MG/1
20 TABLET ORAL DAILY
Status: DISCONTINUED | OUTPATIENT
Start: 2024-07-03 | End: 2024-07-04 | Stop reason: HOSPADM

## 2024-07-03 RX ORDER — IBUPROFEN 200 MG
24 TABLET ORAL
Status: DISCONTINUED | OUTPATIENT
Start: 2024-07-03 | End: 2024-07-04 | Stop reason: HOSPADM

## 2024-07-03 RX ORDER — GLUCAGON 1 MG
1 KIT INJECTION
Status: DISCONTINUED | OUTPATIENT
Start: 2024-07-03 | End: 2024-07-04 | Stop reason: HOSPADM

## 2024-07-03 RX ORDER — SODIUM CHLORIDE 0.9 % (FLUSH) 0.9 %
10 SYRINGE (ML) INJECTION
Status: DISCONTINUED | OUTPATIENT
Start: 2024-07-03 | End: 2024-07-04 | Stop reason: HOSPADM

## 2024-07-03 RX ORDER — ASPIRIN 81 MG/1
81 TABLET ORAL DAILY
Status: DISCONTINUED | OUTPATIENT
Start: 2024-07-03 | End: 2024-07-04 | Stop reason: HOSPADM

## 2024-07-03 RX ORDER — MECLIZINE HYDROCHLORIDE 25 MG/1
25 TABLET ORAL
Status: COMPLETED | OUTPATIENT
Start: 2024-07-03 | End: 2024-07-03

## 2024-07-03 RX ORDER — LANOLIN ALCOHOL/MO/W.PET/CERES
5000 CREAM (GRAM) TOPICAL DAILY
COMMUNITY

## 2024-07-03 RX ADMIN — MECLIZINE HYDROCHLORIDE 25 MG: 25 TABLET ORAL at 10:07

## 2024-07-03 RX ADMIN — ENOXAPARIN SODIUM 40 MG: 40 INJECTION SUBCUTANEOUS at 04:07

## 2024-07-03 RX ADMIN — ONDANSETRON 4 MG: 2 INJECTION INTRAMUSCULAR; INTRAVENOUS at 10:07

## 2024-07-03 RX ADMIN — ASPIRIN 81 MG: 81 TABLET, COATED ORAL at 03:07

## 2024-07-03 RX ADMIN — INSULIN ASPART 3 UNITS: 100 INJECTION, SOLUTION INTRAVENOUS; SUBCUTANEOUS at 09:07

## 2024-07-03 NOTE — NURSING
Patient arrived to the floor via wheelchair accompanied by transport personnel. Patient in stable condition and denies further needs at this time.

## 2024-07-03 NOTE — ED PROVIDER NOTES
Encounter Date: 7/3/2024       History     Chief Complaint   Patient presents with    Vomiting    Nausea    Dizziness    Extremity Weakness     PRESENTS TO ED PER POV WITH C/O DIZZINESS, NAUSEA, VOMITING, FORGETFUL AT TIMES, AND WEAKNESS ONSET YESTERDAY @ 0500 WHEN WAKING UP. BS PER MORNING CHECK AT HOME 344, STATES TOOK ALL MORNING  MEDICATION TODAY BUT NOT YESTERDAY D/T NAUSEA. VANS NEGATIVE     This is a 74-year-old male patient came into the emergency room with history of having nausea, vomiting, dizziness since yesterday 5:00 a.m. ever since he woke up.  Patient reports having worsening of dizziness he turns his head.  Also states that when his walking he is leaning towards left side.  Denies any motor weakness or sensory deficits.  No change in speech.  No prior history of TIA, CVA.  Does have history of hypertension, diabetes.  Denies any CAD, coronary stents, chest pain, shortness of breath, palpitations.  Does have history of hypertension, diabetes, dyslipidemia.            Review of patient's allergies indicates:  No Known Allergies  Past Medical History:   Diagnosis Date    Diabetes mellitus, type 2     Essential tremor     Family history of colon cancer     Hypercholesterolemia     Hypertension     Pernicious anemia      Past Surgical History:   Procedure Laterality Date    CATARACT EXTRACTION Right 04/16/2021    CATARACT EXTRACTION Left 04/02/2021    COLONOSCOPY  12/16/2021    EYE SURGERY  8-2021    cataract surgeries    FRACTURE SURGERY      right leg, tibia and fibia    retina rupture repair Left 2018     Family History   Problem Relation Name Age of Onset    Colon cancer Mother Virginia Schwellinger     Cancer Mother Vesta Soni     Diabetes Mother Vesta Soni     Heart disease Father Bernabe Soni     Prostate cancer Father Bernabe Schwmaye     Cancer Father Bernabe Schwlauraer     Cancer Maternal Grandfather Jamil Griggs      Social History     Tobacco Use    Smoking  status: Former     Current packs/day: 0.00     Average packs/day: 1 pack/day for 38.8 years (38.8 ttl pk-yrs)     Types: Cigarettes, Pipe, Cigars     Start date: 5/1/1975     Quit date: 3/1/2014     Years since quitting: 10.3    Smokeless tobacco: Never   Substance Use Topics    Alcohol use: Not Currently     Alcohol/week: 3.0 standard drinks of alcohol     Types: 3 Cans of beer per week    Drug use: Never     Review of Systems   Constitutional: Negative.  Negative for fatigue and fever.   HENT: Negative.  Negative for drooling, facial swelling and rhinorrhea.    Eyes: Negative.    Respiratory: Negative.  Negative for cough, shortness of breath and stridor.    Cardiovascular:  Negative for chest pain and palpitations.   Gastrointestinal:  Negative for abdominal pain.   Endocrine: Negative.    Genitourinary:  Negative for dysuria, flank pain and frequency.   Musculoskeletal:  Negative for back pain.   Skin:  Negative for rash.   Neurological:  Positive for dizziness and light-headedness. Negative for seizures, syncope, facial asymmetry, speech difficulty, numbness and headaches.   Psychiatric/Behavioral: Negative.     All other systems reviewed and are negative.      Physical Exam     Initial Vitals [07/03/24 0926]   BP Pulse Resp Temp SpO2   137/69 70 18 97.1 °F (36.2 °C) 98 %      MAP       --         Physical Exam    Nursing note and vitals reviewed.  Constitutional: He appears well-developed and well-nourished.   HENT:   Head: Normocephalic and atraumatic.   Eyes: Conjunctivae and EOM are normal.   Neck: Neck supple.   Normal range of motion.  Cardiovascular:  Normal rate, regular rhythm, normal heart sounds and intact distal pulses.           Pulmonary/Chest: Breath sounds normal.   Abdominal: Abdomen is soft. Bowel sounds are normal.   Musculoskeletal:         General: Normal range of motion.      Cervical back: Normal range of motion and neck supple.     Neurological: He is alert and oriented to person, place,  and time. GCS score is 15. GCS eye subscore is 4. GCS verbal subscore is 5. GCS motor subscore is 6.   Skin: Skin is warm and dry. Capillary refill takes less than 2 seconds.   Psychiatric: He has a normal mood and affect.         ED Course   Procedures  Labs Reviewed   COMPREHENSIVE METABOLIC PANEL - Abnormal; Notable for the following components:       Result Value    Glucose 276 (*)     All other components within normal limits   LIPID PANEL - Abnormal; Notable for the following components:    HDL Cholesterol 38 (*)     All other components within normal limits   CBC WITH DIFFERENTIAL - Abnormal; Notable for the following components:    Eos % 0.6 (*)     All other components within normal limits   PROTIME-INR - Normal    Narrative:     Protimes are used to monitor anticoagulant agents such as warfarin. PT INR values are based on the current patient normal mean and the DIOGENES value for the specific instrument reagent used.  **Routine theraputic target values for the INR are 2.0-3.0**   TSH - Normal   TROPONIN I - Normal   NT-PRO NATRIURETIC PEPTIDE - Normal   MAGNESIUM - Normal   CBC W/ AUTO DIFFERENTIAL    Narrative:     The following orders were created for panel order CBC W/ AUTO DIFFERENTIAL.  Procedure                               Abnormality         Status                     ---------                               -----------         ------                     CBC with Differential[[id number]]       Abnormal            Final result                 Please view results for these tests on the individual orders.     EKG Readings: (Independently Interpreted)   EKG shows normal sinus rhythm with heart rate of 61, IA interval 194 QRS duration 84 milliseconds,  milliseconds.  No ST depressions or ST elevations.       Imaging Results              CT Head Without Contrast (Final result)  Result time 07/03/24 10:36:30      Final result by David Reilly III, MD (07/03/24 10:36:30)                   Impression:       1. Mild to moderate cerebral atrophy is noted.      Electronically signed by: David Tommie  Date:    07/03/2024  Time:    10:36               Narrative:    EXAMINATION:  STUDY: CT HEAD WITHOUT CONTRAST    CLINICAL HISTORY AND TECHNIQUE:  Neurologic deficit, suspected CVA    This patient has had 3 CT and nuclear medicine scans performed within the last 12 months.    The following DOSE REDUCTION TECHNIQUES are used for all CT scans at [facility]:    1. Automated exposure control.  2. Adjustment of the mA and/or kv according to patient size.  3. Use of iterative reconstruction technique.    COMPARISON:  11/28/2020    FINDINGS:  Axial imaging through the head/brain was performed. Mild to moderate cerebral atrophy accentuates the ventricles and sulci.  I see no intraparenchymal masses, hemorrhagic lesions, or dominant wedge-shaped infarcts. I see no extra-axial masses or abnormal fluid collections.                                    X-Rays:   Independently Interpreted Readings:   Other Readings:  CT head shows mild to moderate cerebral atrophy    Medications   ondansetron injection 4 mg (4 mg Intravenous Given 7/3/24 1037)   meclizine tablet 25 mg (25 mg Oral Given 7/3/24 1037)     Medical Decision Making  This is a 74-year-old male patient came into the emergency room with history of having nausea, vomiting, dizziness since yesterday 5:00 a.m. ever since he woke up.  Patient reports having worsening of dizziness he turns his head.  Also states that when his walking he is leaning towards left side.  Denies any motor weakness or sensory deficits.  No change in speech.  No prior history of TIA, CVA.  Does have history of hypertension, diabetes.  Denies any CAD, coronary stents, chest pain, shortness of breath, palpitations.  Does have history of hypertension, diabetes, dyslipidemia.  CT head shows mild to moderate cerebral atrophy.    Spoke with Dr. Susan rendon hospitalist and he was accepted the  patient for admission.    Differential diagnosis: 1.  Dizziness 2. Benign positional vertigo 3.  TIA 4. Vertebrobasilar insufficiency    Amount and/or Complexity of Data Reviewed  Labs: ordered.  Radiology: ordered.    Risk  Prescription drug management.  Decision regarding hospitalization.                                      Clinical Impression:  Final diagnoses:  [R29.818] Acute focal neurological deficit  [R42] Dizziness (Primary)  [R26.81] Unsteady gait when walking          ED Disposition Condition    Admit Stable                Mahamed Rodriguez, DO  07/03/24 1200

## 2024-07-03 NOTE — H&P
[facility] Medicine  History & Physical Examination       Patient: González Soni  MRN: [id number]  STATUS: Emergency   DOS: 7/3/2024   PCP: Naina Spear FNP-C      CC: dizziness       HISTORY OF PRESENT ILLNESS   74 y.o. male with a history that includes DM II on insulin, HTN, HLD, presented to ED with worsening dizziness for last 3-4 days. Reports worse with movement but also at rest. Last night started with nausea and vomiting. Wife reports grabbing on to things and walking with walker leaning and falling to left. In ER, CT Brain mild/mod atrophy, no acute findings. Labs unremarkable. A1C 8.5 1 week ago. HM consulted for admission further workup/tx with imaging and physical therapy. Deny fever, chest pain, sob, H/A, or LOC.    REVIEW OF SYSTEMS     Except as documented, all other systems reviewed and negative       PAST MEDICAL HISTORY     Past Medical History:   Diagnosis Date    Diabetes mellitus, type 2     Essential tremor     Family history of colon cancer     Hypercholesterolemia     Hypertension     Pernicious anemia           PAST SURGICAL HISTORY     Past Surgical History:   Procedure Laterality Date    CATARACT EXTRACTION Right 04/16/2021    CATARACT EXTRACTION Left 04/02/2021    COLONOSCOPY  12/16/2021    EYE SURGERY  8-2021    cataract surgeries    FRACTURE SURGERY      right leg, tibia and fibia    retina rupture repair Left 2018          FAMILY HISTORY     Reviewed, negative in relation to patient's current condition.      SOCIAL HISTORY     Denies alcohol, tobacco or drug abuse    Social History     Tobacco Use    Smoking status: Former     Current packs/day: 0.00     Average packs/day: 1 pack/day for 38.8 years (38.8 ttl pk-yrs)     Types: Cigarettes, Pipe, Cigars     Start date: 5/1/1975     Quit date: 3/1/2014     Years since quitting: 10.3    Smokeless tobacco: Never   Substance Use Topics    Alcohol use: Not Currently     Alcohol/week: 3.0 standard drinks of alcohol     Types: 3  "Cans of beer per week          ALLERGIES     Patient has no known allergies.      HOME MEDICATIONS     Current Outpatient Medications   Medication Instructions    CONTOUR NEXT TEST STRIPS Strp Use as directed to check blood glucose FOUR TIMES A DAY.    FARXIGA 10 mg, Oral, Every morning    insulin aspart U-100 (NOVOLOG FLEXPEN U-100 INSULIN) 50 Units, Subcutaneous, 3 times daily    insulin degludec (TRESIBA FLEXTOUCH U-200) 120 Units, Subcutaneous, Daily    lisinopriL (PRINIVIL,ZESTRIL) 20 mg, Oral    rosuvastatin (CRESTOR) 20 mg, Oral          PHYSICAL EXAM   VITALS: T 97.6 °F (36.4 °C)   /66   P 60   RR 14   O2 98 %    GENERAL: Awake and in NAD  HEENT: NC/AT, EOMI, PERRL.  NECK: Supple,  No JVD  LUNGS: CTA B/L  CVS: RRR, S1S2 positive  GI/: Soft, NT/ND, bowel sounds positive.  EXTREMITIES: Peripheral pulses 2+, no peripheral edema  DERM: Warm, dry.  No rashes or lesions noted.  NEURO: AAOx3, no focal neurologic deficit  PSYCH: Cooperative, appropriate mood and affect       DIAGNOSTICS     Recent Labs     07/03/24  1008   WBC 7.21   RBC 5.02   HGB 15.6   HCT 46.3   MCV 92.2   MCH 31.1   MCHC 33.7   RDW 12.8        No results for input(s): "LACTIC" in the last 72 hours.  Recent Labs     07/03/24  1008   INR 1.0     Recent Labs     07/03/24  1008   CHOL 154   TRIG 140   HDL 38*      Recent Labs     07/03/24  1008      K 4.2   CO2 24   BUN 15   CREATININE 0.93   GLUCOSE 276*   CALCIUM 8.6   MG 2.30   ALBUMIN 4.0   GLOBULIN 3.1   ALKPHOS 109   ALT 18   AST 21   BILITOT 0.7   TSH 0.880     Recent Labs     07/03/24  1008   TROPONINI <0.012          CT Head Without Contrast  Narrative: EXAMINATION:  STUDY: CT HEAD WITHOUT CONTRAST    CLINICAL HISTORY AND TECHNIQUE:  Neurologic deficit, suspected CVA    This patient has had 3 CT and nuclear medicine scans performed within the last 12 months.    The following DOSE REDUCTION TECHNIQUES are used for all CT scans at [facility]:    1. " Automated exposure control.  2. Adjustment of the mA and/or kv according to patient size.  3. Use of iterative reconstruction technique.    COMPARISON:  11/28/2020    FINDINGS:  Axial imaging through the head/brain was performed. Mild to moderate cerebral atrophy accentuates the ventricles and sulci.  I see no intraparenchymal masses, hemorrhagic lesions, or dominant wedge-shaped infarcts. I see no extra-axial masses or abnormal fluid collections.  Impression: 1. Mild to moderate cerebral atrophy is noted.    Electronically signed by: David Reilly  Date:    07/03/2024  Time:    10:36        ASSESSMENT   Dizziness   Weakness  Unsteady Gait  DM II  HTN  Anemia  HLD    PLAN   Admit to inpatient  Order MRI Brain  US Carotids  Physical therapy  Aspirin, lovenox  Sliding scale insulin  Meclizine prn  Resume home meds    Prophylaxis: lovenox   Code Status: full code      Patient Screened for food insecurity, housing instability, transportation needs, utility difficulties, and interpersonal safety.  No needs identified      Bruce Moran MD  Hospital Medicine        If the patient has been admitted under observation status, it is at my discretion and under our care with hospital medicine services. [TWA]

## 2024-07-03 NOTE — PLAN OF CARE
07/03/24 1458   Discharge Assessment   Assessment Type Discharge Planning Assessment   Confirmed/corrected address, phone number and insurance Yes   Confirmed Demographics Correct on Facesheet   Source of Information patient   When was your last doctors appointment? 05/15/24   Communicated JANEL with patient/caregiver Yes   Reason For Admission Acute focal neurological deficit, Dizziness, Unsteady gait when walking   People in Home spouse   Facility Arrived From: Home   Do you expect to return to your current living situation? Yes   Do you have help at home or someone to help you manage your care at home? Yes   Who are your caregiver(s) and their phone number(s)? Kathleen Soni-Wife  [phone]   Prior to hospitilization cognitive status: Alert/Oriented   Current cognitive status: Alert/Oriented   Walking or Climbing Stairs Difficulty no   Dressing/Bathing Difficulty no   Equipment Currently Used at Home glucometer;walker, standard;rollator;cane, straight   Readmission within 30 days? No   Patient currently being followed by outpatient case management? No   Do you currently have service(s) that help you manage your care at home? No   Do you take prescription medications? Yes   Do you have prescription coverage? Yes   Coverage Medicare   Do you have any problems affording any of your prescribed medications? No   Is the patient taking medications as prescribed? yes   Who is going to help you get home at discharge? Wife   How do you get to doctors appointments? family or friend will provide;car, drives self   Are you on dialysis? No   Do you take coumadin? No   Discharge Plan A Home Health   Discharge Plan B Rehab   DME Needed Upon Discharge  none   Discharge Plan discussed with: Spouse/sig other;Patient   Name(s) and Number(s) Kathleen Soni=Wife  [phone]   Transition of Care Barriers None   Physical Activity   On average, how many days per week do you engage in moderate to strenuous exercise (like  a brisk walk)? 7 days   On average, how many minutes do you engage in exercise at this level? 30 min   Financial Resource Strain   How hard is it for you to pay for the very basics like food, housing, medical care, and heating? Not hard   Housing Stability   In the last 12 months, was there a time when you were not able to pay the mortgage or rent on time? N   At any time in the past 12 months, were you homeless or living in a shelter (including now)? N   Transportation Needs   Has the lack of transportation kept you from medical appointments, meetings, work or from getting things needed for daily living? No   Food Insecurity   Within the past 12 months, you worried that your food would run out before you got the money to buy more. Never true   Within the past 12 months, the food you bought just didn't last and you didn't have money to get more. Never true   Stress   Do you feel stress - tense, restless, nervous, or anxious, or unable to sleep at night because your mind is troubled all the time - these days? Not at all   Social Isolation   How often do you feel lonely or isolated from those around you?  Never   Alcohol Use   Q1: How often do you have a drink containing alcohol? Never   Q2: How many drinks containing alcohol do you have on a typical day when you are drinking? None   Q3: How often do you have six or more drinks on one occasion? Never   Health Literacy   How often do you need to have someone help you when you read instructions, pamphlets, or other written material from your doctor or pharmacy? Sometimes   OTHER   Name(s) of People in Home Kathleen Soni-Wife

## 2024-07-04 VITALS
HEART RATE: 55 BPM | TEMPERATURE: 97 F | DIASTOLIC BLOOD PRESSURE: 64 MMHG | SYSTOLIC BLOOD PRESSURE: 132 MMHG | OXYGEN SATURATION: 97 % | WEIGHT: 226.63 LBS | BODY MASS INDEX: 35.57 KG/M2 | RESPIRATION RATE: 20 BRPM | HEIGHT: 67 IN

## 2024-07-04 PROBLEM — R42 DIZZINESS: Status: ACTIVE | Noted: 2024-07-04

## 2024-07-04 LAB
POCT GLUCOSE: 180 MG/DL (ref 70–110)
POCT GLUCOSE: 270 MG/DL (ref 70–110)

## 2024-07-04 PROCEDURE — 25000003 PHARM REV CODE 250: Performed by: FAMILY MEDICINE

## 2024-07-04 PROCEDURE — 25000003 PHARM REV CODE 250: Performed by: INTERNAL MEDICINE

## 2024-07-04 PROCEDURE — 94761 N-INVAS EAR/PLS OXIMETRY MLT: CPT

## 2024-07-04 RX ORDER — ASPIRIN 81 MG/1
81 TABLET ORAL DAILY
Start: 2024-07-05 | End: 2025-07-05

## 2024-07-04 RX ADMIN — LISINOPRIL 20 MG: 10 TABLET ORAL at 08:07

## 2024-07-04 RX ADMIN — INSULIN ASPART 6 UNITS: 100 INJECTION, SOLUTION INTRAVENOUS; SUBCUTANEOUS at 11:07

## 2024-07-04 RX ADMIN — ASPIRIN 81 MG: 81 TABLET, COATED ORAL at 08:07

## 2024-07-04 NOTE — NURSING
Patient discharged from the floor accompanied by transport personnel. Patient in stable condition and denies further needs at this time.

## 2024-07-04 NOTE — DISCHARGE INSTRUCTIONS
-Limit activity as tolerated. Change positions slowly  -Take medications as prescribed  -Someone will call you at a later time/date with follow-up appointment.

## 2024-07-04 NOTE — DISCHARGE SUMMARY
Hospital Medicine  Discharge Summary    Patient Name: González Soni  MRN: [id number]  Admit Date: 7/3/2024  Discharge Date:  7/4/2024  Status: IP- Inpatient   Length of Stay: 1      PHYSICIANS   Admitting Physician: Bruce Moran MD  Discharging Physician: Bruce Moran MD.  Primary Care Physician: Naina Spear FNP-C      DISCHARGE DIAGNOSES   Dizziness   Weakness  Unsteady Gait  DM II  HTN  Anemia  HLD    PROCEDURES   * No surgery found *         HOSPITAL COURSE    74 y.o. male with a history that includes DM II on insulin, HTN, HLD, presented to ED with worsening dizziness for last 3-4 days. Reports worse with movement but also at rest. Last night started with nausea and vomiting. Wife reports grabbing on to things and walking with walker leaning and falling to left. In ER, CT Brain mild/mod atrophy, no acute findings. Labs unremarkable. A1C 8.5 1 week ago.  consulted for admission further workup/tx with imaging and physical therapy. Deny fever, chest pain, sob, H/A, or LOC.     07/04/2024  Dizziness resolved  Ambulating without issue  MRI Brain no acute findings  US carotids - no significant stenosis   Plan d/c home today  Cont asa 81 mg daily     STATUS  Improved, Stable    DISPOSITION  Discharge to home     DIET  Cardiac     ACTIVITY  As tolerated      FOLLOW-UP      Follow-up Information       Naina Spear FNP-C Follow up.    Specialty: Family Medicine  Why: Someone will call you at a later time/date with follow-up appointment  Contact information:  [address] [place] [id number] [phone]                               DISCHARGE MEDICATION RECONCILIATION        Medication List        START taking these medications      aspirin 81 MG EC tablet  Commonly known as: ECOTRIN  Take 1 tablet (81 mg total) by mouth once daily.  Start taking on: July 5, 2024            CONTINUE taking these medications      CONTOUR NEXT TEST STRIPS Strp  Generic drug: blood sugar  "diagnostic  Use as directed to check blood glucose FOUR TIMES A DAY.     FARXIGA 10 mg tablet  Generic drug: dapagliflozin propanediol  TAKE 1 TABLET BY MOUTH EVERY MORNING     insulin degludec 200 unit/mL (3 mL) insulin pen  Commonly known as: TRESIBA FLEXTOUCH U-200  Inject 120 Units into the skin Daily.     lisinopriL 20 MG tablet  Commonly known as: PRINIVIL,ZESTRIL  take one tablet by mouth DAILY     rosuvastatin 20 MG tablet  Commonly known as: CRESTOR  TAKE 1 TABLET BY MOUTH EVERY DAY     VITAMIN B-12 1000 MCG tablet  Generic drug: cyanocobalamin               Where to Get Your Medications        Information about where to get these medications is not yet available    Ask your nurse or doctor about these medications  aspirin 81 MG EC tablet           PHYSICAL EXAM   VITALS: T 96.9 °F (36.1 °C)   /64   P (!) 55   RR 20   O2 97 %    Physical Exam  Vitals reviewed.   HENT:      Head: Normocephalic.   Cardiovascular:      Rate and Rhythm: Normal rate.   Pulmonary:      Effort: Pulmonary effort is normal.   Neurological:      Mental Status: He is alert.            DIAGNOSITCS   CBC:   Recent Labs   Lab 07/03/24  1008   WBC 7.21   HGB 15.6   HCT 46.3          CMP:   Recent Labs   Lab 07/03/24  1008   CALCIUM 8.6   ALBUMIN 4.0      K 4.2   CO2 24      BUN 15   CREATININE 0.93   ALKPHOS 109   ALT 18   AST 21   BILITOT 0.7   MG 2.30     Estimated Creatinine Clearance: 80.8 mL/min (based on SCr of 0.93 mg/dL).    Labs within the past 24 hours have been reviewed.     COAG:  Recent Labs   Lab 07/03/24  1008   INR 1.0       CARDIAC ENZYMES:   Recent Labs     07/03/24  1008   TROPONINI <0.012        No results for input(s): "AMYLASE", "LIPASE" in the last 168 hours.    Recent Labs     07/03/24  1728 07/03/24  2115 07/04/24  0802 07/04/24  1103   POCTGLUCOSE 167* 274* 180* 270*        Microbiology Results (last 7 days)       ** No results found for the last 168 hours. **             Recent Labs     " 07/03/24  1008   CHOL 154   TRIG 140   HDL 38*      Lab Results   Component Value Date    HGBA1C 8.5 (H) 06/25/2024        US Carotid Bilateral    Result Date: 7/3/2024  EXAMINATION: US CAROTID BILATERAL CLINICAL HISTORY: weakness, gait instability; TECHNIQUE: Grayscale and color Doppler ultrasound examination of the carotid and vertebral artery systems bilaterally.  Stenosis estimates are per the NASCET measurement criteria. COMPARISON: None. FINDINGS: Right: Internal Carotid Artery (ICA) peak systolic velocity 129.1 cm/sec.  Right common carotid artery peak systolic velocity is 89.3 cm/s ICA/CCA peak systolic velocity ratio: 1.5 Plaque formation: there is intimal thickening of the common carotid artery. There is moderate plaque formation at the right carotid bulb and proximal ICA Vertebral artery: Antegrade flow and normal waveform. Left: Internal Carotid Artery (ICA)  peak systolic velocity 102.3 cm/sec.  Left common carotid artery peak systolic velocity is 86.2 cm/s ICA/CCA peak systolic velocity ratio: 1.1 Plaque formation: Mild moderate plaquing noted at the left carotid bulb and ICA Vertebral artery: Antegrade flow and normal waveform.     50-69% stenosis of the right proximal ICA.  No flow limiting stenosis. Mild moderate plaques of both proximal ICAs and carotid bulbs. Electronically signed by: Edgar Keene MD Date:    07/03/2024 Time:    18:07    MRI Brain Without Contrast    Result Date: 7/3/2024  EXAMINATION: MRI of the brain without contrast CLINICAL HISTORY: Dizziness, nausea, vomiting, weakness COMPARISON: None the midline structures TECHNIQUE: Multiplanar, multisequence MR imaging of the brain was performed without contrast FINDINGS: No abnormal restricted diffusion is seen.  Midline structures including pituitary gland, thin corpus callosum, pineal gland region, and optic chiasm are unremarkable.  No inferior cerebellar ectopia.  Visualized marrow signal is unremarkable. The ventricles and sulci  are globally proportional prominent compatible with moderately involutional changes of the brain.  Major intracranial flow voids are identified and unremarkable.  The globes and retrobulbar structures appear within normal limits except for prior bilateral cataract surgery.  CP angles appear normal.  There is no hemosiderin deposition.  The brain parenchyma demonstrates normal signal intensity signal, except for minimal periventricular increased FLAIR signal, most likely reflecting chronic micro ischemic change.  No extra-axial collection, midline shift, mass-effect, or acute hemorrhage.     No acute intracranial abnormality.  Chronic changes as above. Electronically signed by: Edgar Keene MD Date:    07/03/2024 Time:    18:03    CT Head Without Contrast    Result Date: 7/3/2024  EXAMINATION: STUDY: CT HEAD WITHOUT CONTRAST CLINICAL HISTORY AND TECHNIQUE: Neurologic deficit, suspected CVA This patient has had 3 CT and nuclear medicine scans performed within the last 12 months. The following DOSE REDUCTION TECHNIQUES are used for all CT scans at [facility]: 1. Automated exposure control. 2. Adjustment of the mA and/or kv according to patient size. 3. Use of iterative reconstruction technique. COMPARISON: 11/28/2020 FINDINGS: Axial imaging through the head/brain was performed. Mild to moderate cerebral atrophy accentuates the ventricles and sulci.  I see no intraparenchymal masses, hemorrhagic lesions, or dominant wedge-shaped infarcts. I see no extra-axial masses or abnormal fluid collections.     1. Mild to moderate cerebral atrophy is noted. Electronically signed by: David Reilly Date:    07/03/2024 Time:    10:36      N/A     Patient Screened for food insecurity, housing instability, transportation needs, utility difficulties, and interpersonal safety.  No needs identified    Discharge time: 33 minutes         Bruce Moran MD  [facility] Medicine

## 2024-07-04 NOTE — NURSING
Provided patient with discharge education focusing on activity, diet, worsening signs/symptoms, taking medications, and keeping follow-up appointment. Patient verbalized understanding and denies further questions at this time.

## 2024-07-04 NOTE — PLAN OF CARE
Problem: Adult Inpatient Plan of Care  Goal: Plan of Care Review  Outcome: Progressing  Goal: Patient-Specific Goal (Individualized)  Outcome: Progressing  Goal: Absence of Hospital-Acquired Illness or Injury  Outcome: Progressing  Goal: Optimal Comfort and Wellbeing  Outcome: Progressing  Goal: Readiness for Transition of Care  Outcome: Progressing     Problem: Diabetes Comorbidity  Goal: Blood Glucose Level Within Targeted Range  Outcome: Progressing     Problem: Fall Injury Risk  Goal: Absence of Fall and Fall-Related Injury  Outcome: Progressing    No c/o dizziness tonight when getting up to ambulate to bathroom;bradycardic on telemetry;meds administered as ordered per md

## 2024-07-04 NOTE — PT/OT/SLP PROGRESS
Physical Therapy      Patient Name:  González Soni   MRN:  [id number]    Patient not seen today secondary to currently being DC home with nurse and all needs already met .

## 2024-07-05 ENCOUNTER — PATIENT OUTREACH (OUTPATIENT)
Dept: ADMINISTRATIVE | Facility: CLINIC | Age: 74
End: 2024-07-05
Payer: MEDICARE

## 2024-07-05 NOTE — PROGRESS NOTES
C3 nurse spoke with González Soni  for a TCC post hospital discharge follow up call. The patient does not have a scheduled HOSFU appointment with Naina Spear FNP-C  within 5-7 days post hospital discharge date 07/04/2024.     Message sent to PCP staff requesting they contact patient and schedule follow up appointment.

## 2024-07-08 LAB
OHS QRS DURATION: 84 MS
OHS QTC CALCULATION: 384 MS

## 2024-07-12 ENCOUNTER — OFFICE VISIT (OUTPATIENT)
Dept: FAMILY MEDICINE | Facility: CLINIC | Age: 74
End: 2024-07-12
Payer: MEDICARE

## 2024-07-12 VITALS
DIASTOLIC BLOOD PRESSURE: 68 MMHG | SYSTOLIC BLOOD PRESSURE: 118 MMHG | TEMPERATURE: 98 F | WEIGHT: 225 LBS | BODY MASS INDEX: 35.31 KG/M2 | HEIGHT: 67 IN | OXYGEN SATURATION: 97 % | HEART RATE: 71 BPM

## 2024-07-12 DIAGNOSIS — Z09 HOSPITAL DISCHARGE FOLLOW-UP: Primary | ICD-10-CM

## 2024-07-12 DIAGNOSIS — I65.21 STENOSIS OF RIGHT CAROTID ARTERY: ICD-10-CM

## 2024-07-12 NOTE — PROGRESS NOTES
Patient ID: González Soni  : 1950    Chief Complaint: Hospital Follow Up    Allergies: Patient has No Known Allergies.     History of Present Illness:  The patient is a 74 y.o. White male who presents to clinic for follow up on Hospital Follow Up     He presented to the ED with complains of dizziness that was profound upon awakening, later he became nauseated and vomited once.  dizziness since yesterday 5:00 a.m. ever since he woke up.  No prior history of TIA, CVA.  Does have history of hypertension, diabetes.  Denies any CAD, coronary stents, chest pain, shortness of breath, palpitations.  Does have history of hypertension, diabetes, dyslipidemia.    Reviewed all hospital notes, labs and diagnostics.   CT head shows mild to moderate cerebral atrophy. MRI same; CUS showed WALT stenosis of 50-69%.      His symptoms resolved before discharge the next day and have not returned. He has no deficits.          Social History:  reports that he quit smoking about 10 years ago. His smoking use included cigarettes, pipe, and cigars. He started smoking about 49 years ago. He has a 38.8 pack-year smoking history. He has never used smokeless tobacco. He reports that he does not currently use alcohol after a past usage of about 3.0 standard drinks of alcohol per week. He reports that he does not use drugs.    Past Medical History:  has a past medical history of Diabetes mellitus, type 2, Essential tremor, Family history of colon cancer, Hypercholesterolemia, Hypertension, and Pernicious anemia.    Current Medications:  Current Outpatient Medications   Medication Instructions    aspirin (ECOTRIN) 81 mg, Oral, Daily    CONTOUR NEXT TEST STRIPS Strp Use as directed to check blood glucose FOUR TIMES A DAY.    cyanocobalamin (VITAMIN B-12) 5,000 mcg, Oral, Daily    FARXIGA 10 mg, Oral, Every morning    insulin aspart, niacinamide, (FIASP FLEXTOUCH U-100 INSULIN SUBQ)     insulin degludec (TRESIBA FLEXTOUCH U-200) 120  "Units, Subcutaneous, Daily    lisinopriL (PRINIVIL,ZESTRIL) 20 mg, Oral    rosuvastatin (CRESTOR) 20 mg, Oral       Review of Systems   See HPI    Visit Vitals  /68 (BP Location: Left arm, Patient Position: Sitting, BP Method: Medium (Manual))   Pulse 71   Temp 98.4 °F (36.9 °C) (Temporal)   Ht 5' 7.01" (1.702 m)   Wt 102.1 kg (225 lb)   SpO2 97%   BMI 35.23 kg/m²       Physical Exam  Vitals reviewed.   Constitutional:       Appearance: Normal appearance. He is obese.   Eyes:      Conjunctiva/sclera: Conjunctivae normal.   Neck:      Vascular: No carotid bruit.   Cardiovascular:      Rate and Rhythm: Normal rate and regular rhythm.   Pulmonary:      Breath sounds: Normal breath sounds.   Musculoskeletal:      Cervical back: Neck supple.   Skin:     General: Skin is warm and dry.   Neurological:      General: No focal deficit present.          Labs Reviewed:  Chemistry:  Lab Results   Component Value Date     07/03/2024    K 4.2 07/03/2024    BUN 15 07/03/2024    CREATININE 0.93 07/03/2024    EGFRNORACEVR 87 07/03/2024    GLUCOSE 276 (H) 07/03/2024    CALCIUM 8.6 07/03/2024    ALKPHOS 109 07/03/2024    LABPROT 7.1 07/03/2024    LABPROT 10.0 07/03/2024    ALBUMIN 4.0 07/03/2024    AST 21 07/03/2024    ALT 18 07/03/2024    MG 2.30 07/03/2024    TSH 0.880 07/03/2024    BUZQIB8ZOBI 0.98 02/14/2024    PSA 0.34 06/25/2024        Lab Results   Component Value Date    HGBA1C 8.5 (H) 06/25/2024        Hematology:  Lab Results   Component Value Date    WBC 7.21 07/03/2024    RBC 5.02 07/03/2024    HGB 15.6 07/03/2024    HCT 46.3 07/03/2024    MCV 92.2 07/03/2024    MCH 31.1 07/03/2024    MCHC 33.7 07/03/2024    RDW 12.8 07/03/2024     07/03/2024    MPV 11.8 07/03/2024       Lipid Panel:  Lab Results   Component Value Date    CHOL 154 07/03/2024    HDL 38 (L) 07/03/2024    LDLDIRECT 87.7 07/03/2024    TRIG 140 07/03/2024        Assessment & Plan:  1. Hospital discharge follow-up  Comments:  Complete " resolution of symptoms.  Already on ASA and advised to continue this.    2. Stenosis of right carotid artery  Comments:  Will refer to Cardiology for further evaluation and work up.  Overview:  Lipid Panel:  Lab Results   Component Value Date    CHOL 154 07/03/2024    HDL 38 (L) 07/03/2024    LDLDIRECT 87.7 07/03/2024    TRIG 140 07/03/2024      On Rosuvastatin 20 mg daily.     Assessment & Plan:  Will refer to Cardiology for further evaluation and work up.    Orders:  -     Ambulatory referral/consult to Cardiology; Future; Expected date: 07/19/2024         Future Appointments   Date Time Provider Department Center   7/12/2024  3:30 PM Rainer, Naina L., FNP-C [facility]   9/24/2024 11:30 AM Rainer, Naina L., FNP-C [facility]       No follow-ups on file. Call sooner if needed.    MIRANDA Santos

## 2024-07-15 ENCOUNTER — PATIENT MESSAGE (OUTPATIENT)
Dept: FAMILY MEDICINE | Facility: CLINIC | Age: 74
End: 2024-07-15
Payer: MEDICARE

## 2024-07-24 DIAGNOSIS — E11.9 TYPE 2 DIABETES MELLITUS WITHOUT COMPLICATION, WITH LONG-TERM CURRENT USE OF INSULIN: ICD-10-CM

## 2024-07-24 DIAGNOSIS — Z79.4 TYPE 2 DIABETES MELLITUS WITHOUT COMPLICATION, WITH LONG-TERM CURRENT USE OF INSULIN: ICD-10-CM

## 2024-07-24 RX ORDER — INSULIN DEGLUDEC 200 U/ML
INJECTION, SOLUTION SUBCUTANEOUS
Qty: 6 PEN | Refills: 11 | Status: SHIPPED | OUTPATIENT
Start: 2024-07-24

## 2024-09-24 ENCOUNTER — OFFICE VISIT (OUTPATIENT)
Dept: FAMILY MEDICINE | Facility: CLINIC | Age: 74
End: 2024-09-24
Payer: MEDICARE

## 2024-09-24 VITALS
HEIGHT: 67 IN | DIASTOLIC BLOOD PRESSURE: 68 MMHG | HEART RATE: 69 BPM | BODY MASS INDEX: 35.53 KG/M2 | TEMPERATURE: 97 F | SYSTOLIC BLOOD PRESSURE: 116 MMHG | WEIGHT: 226.38 LBS | OXYGEN SATURATION: 98 %

## 2024-09-24 DIAGNOSIS — E11.9 TYPE 2 DIABETES MELLITUS WITHOUT COMPLICATION, WITH LONG-TERM CURRENT USE OF INSULIN: ICD-10-CM

## 2024-09-24 DIAGNOSIS — E78.00 HYPERCHOLESTEROLEMIA: ICD-10-CM

## 2024-09-24 DIAGNOSIS — Z00.00 MEDICARE ANNUAL WELLNESS VISIT, SUBSEQUENT: Primary | ICD-10-CM

## 2024-09-24 DIAGNOSIS — Z12.5 SCREENING FOR MALIGNANT NEOPLASM OF PROSTATE: ICD-10-CM

## 2024-09-24 DIAGNOSIS — I10 PRIMARY HYPERTENSION: ICD-10-CM

## 2024-09-24 DIAGNOSIS — Z79.4 TYPE 2 DIABETES MELLITUS WITHOUT COMPLICATION, WITH LONG-TERM CURRENT USE OF INSULIN: ICD-10-CM

## 2024-09-24 DIAGNOSIS — I65.21 STENOSIS OF RIGHT CAROTID ARTERY: ICD-10-CM

## 2024-09-24 DIAGNOSIS — E66.01 CLASS 2 SEVERE OBESITY DUE TO EXCESS CALORIES WITH SERIOUS COMORBIDITY AND BODY MASS INDEX (BMI) OF 35.0 TO 35.9 IN ADULT: ICD-10-CM

## 2024-09-24 PROBLEM — E66.812 CLASS 2 SEVERE OBESITY DUE TO EXCESS CALORIES WITH SERIOUS COMORBIDITY AND BODY MASS INDEX (BMI) OF 35.0 TO 35.9 IN ADULT: Status: ACTIVE | Noted: 2024-09-24

## 2024-09-24 PROBLEM — R42 DIZZINESS: Status: RESOLVED | Noted: 2024-07-04 | Resolved: 2024-09-24

## 2024-09-24 PROCEDURE — G0439 PPPS, SUBSEQ VISIT: HCPCS | Mod: ,,, | Performed by: NURSE PRACTITIONER

## 2024-09-24 PROCEDURE — 99213 OFFICE O/P EST LOW 20 MIN: CPT | Mod: ,,, | Performed by: NURSE PRACTITIONER

## 2024-09-24 RX ORDER — EZETIMIBE 10 MG/1
10 TABLET ORAL
COMMUNITY
Start: 2024-08-29 | End: 2024-09-27 | Stop reason: SDUPTHER

## 2024-09-24 RX ORDER — INSULIN DEGLUDEC 200 U/ML
26 INJECTION, SOLUTION SUBCUTANEOUS DAILY
Start: 2024-09-24 | End: 2024-09-24 | Stop reason: SDUPTHER

## 2024-09-24 RX ORDER — DAPAGLIFLOZIN 10 MG/1
10 TABLET, FILM COATED ORAL EVERY MORNING
Qty: 90 TABLET | Refills: 3 | Status: SHIPPED | OUTPATIENT
Start: 2024-09-24

## 2024-09-24 RX ORDER — INSULIN DEGLUDEC 200 U/ML
26 INJECTION, SOLUTION SUBCUTANEOUS DAILY
Qty: 3.9 ML | Refills: 0 | Status: SHIPPED | OUTPATIENT
Start: 2024-09-24 | End: 2024-09-27 | Stop reason: SDUPTHER

## 2024-09-24 RX ORDER — ROSUVASTATIN CALCIUM 20 MG/1
20 TABLET, COATED ORAL DAILY
Qty: 90 TABLET | Refills: 3 | Status: SHIPPED | OUTPATIENT
Start: 2024-09-24

## 2024-09-24 RX ORDER — SEMAGLUTIDE 0.68 MG/ML
0.25 INJECTION, SOLUTION SUBCUTANEOUS
Qty: 3 ML | Refills: 2 | Status: SHIPPED | OUTPATIENT
Start: 2024-09-24

## 2024-09-24 RX ORDER — LISINOPRIL 20 MG/1
20 TABLET ORAL DAILY
Qty: 90 TABLET | Refills: 3 | Status: SHIPPED | OUTPATIENT
Start: 2024-09-24

## 2024-09-24 RX ORDER — PEN NEEDLE, DIABETIC 32GX 5/32"
NEEDLE, DISPOSABLE MISCELLANEOUS
COMMUNITY
Start: 2024-07-24

## 2024-09-24 NOTE — ASSESSMENT & PLAN NOTE
Lab Results   Component Value Date    CHOL 154 07/03/2024    HDL 38 (L) 07/03/2024    LDLDIRECT 87.7 07/03/2024    TRIG 140 07/03/2024     Continue Rosuvastatin + Zetia 10 mg daily (added by Cardiology)  LDL Goal: <70  Educated on dietary modifications. Follow a low cholesterol, low saturated fat diet with less that 200mg of cholesterol a day.  Avoid fried foods and high saturated fats.  Increase dietary fiber.  Regular exercise can reduce LDL (bad cholesterol) and raise HDL (good cholesterol). Encourage physical activity 5 times per week for 30 minutes per day.

## 2024-09-24 NOTE — ASSESSMENT & PLAN NOTE

## 2024-09-24 NOTE — PROGRESS NOTES
Patient ID: González Soni  : 1950    Chief Complaint: Medicare AWV Follow Up      History of Present Illness:  The patient is a 74 y.o. White male who presents to clinic for annual wellness visit.      He is doing well in general. He did wellness labs in /July. His A1c was 8.5%; he has been managed by Endocrinology. He is on mealtime insulin and Tresiba. He does report some lows at times. He has never tried a GLP1 previously. He  has increased his exercise and has lost some weight.     He est with Cardiology and Dr Ramirez wants his LDL to be 50 so he was started on Zetia in addition to the statin therapy.       Allergies: Patient has No Known Allergies.     Past Medical History:  has a past medical history of Diabetes mellitus, type 2, Essential tremor, Family history of colon cancer, Hypercholesterolemia, Hypertension, and Pernicious anemia.    Surgical History:  has a past surgical history that includes Colonoscopy (2021); Cataract extraction (Right, 2021); Cataract extraction (Left, 2021); retina rupture repair (Left, ); Eye surgery (); and Fracture surgery ().    Family History: family history includes Cancer in his father, maternal grandfather, and mother; Colon cancer in his mother; Diabetes in his mother; Heart disease in his father; Prostate cancer in his father.    Social History:  reports that he quit smoking about 10 years ago. His smoking use included cigarettes, pipe, and cigars. He started smoking about 49 years ago. He has a 38.8 pack-year smoking history. He has never used smokeless tobacco. He reports that he does not currently use alcohol after a past usage of about 3.0 standard drinks of alcohol per week. He reports that he does not use drugs.    Care Team: Patient Care Team:  Naina Spear FNP-C as PCP - General (Family Medicine)     Current Medications:  Current Outpatient Medications   Medication Instructions    aspirin (ECOTRIN) 81 mg,  "Oral, Daily    CONTOUR NEXT TEST STRIPS Strp Use as directed to check blood glucose FOUR TIMES A DAY.    cyanocobalamin (VITAMIN B-12) 5,000 mcg, Oral, Daily    dapagliflozin propanediol (FARXIGA) 10 mg, Oral, Every morning    ezetimibe (ZETIA) 10 mg, Oral    insulin aspart, niacinamide, (FIASP FLEXTOUCH U-100 INSULIN SUBQ)     insulin degludec (TRESIBA FLEXTOUCH U-200) 26 Units, Subcutaneous, Daily    lisinopriL (PRINIVIL,ZESTRIL) 20 mg, Oral, Daily    OZEMPIC 0.25 mg, Subcutaneous, Every 7 days    rosuvastatin (CRESTOR) 20 mg, Oral, Daily    TECHLITE PEN NEEDLE 32 gauge x 5/32" Ndle USE with insulin FOUR TIMES A DAY       Opioid Screening: Patient medication list reviewed, patient is not taking prescription opioids. Patient is not using additional opioids than prescribed. Patient is at low risk of substance abuse based on this opioid use history.     Patient Reported Health Risk Assessment       Review of Systems   Constitutional:  Negative for activity change, appetite change, fatigue and unexpected weight change.   HENT:  Negative for ear pain and hearing loss.    Eyes:  Negative for visual disturbance.   Respiratory:  Negative for apnea, cough, chest tightness, shortness of breath and wheezing.    Cardiovascular:  Negative for chest pain, palpitations, leg swelling and claudication.   Gastrointestinal:  Negative for abdominal pain, anal bleeding, blood in stool, change in bowel habit, nausea, vomiting and reflux.   Endocrine: Negative for cold intolerance, heat intolerance, polydipsia, polyphagia and polyuria.   Genitourinary:  Negative for dysuria, frequency, hematuria and urgency.   Musculoskeletal:  Negative for arthralgias.   Integumentary:  Negative for rash and mole/lesion.   Allergic/Immunologic: Negative for environmental allergies.   Neurological:  Negative for dizziness, headaches and memory loss.        Visit Vitals  /68 (BP Location: Right arm, Patient Position: Sitting, BP Method: Medium " "(Manual))   Pulse 69   Temp 97.3 °F (36.3 °C) (Temporal)   Ht 5' 7.01" (1.702 m)   Wt 102.7 kg (226 lb 6.4 oz)   SpO2 98%   BMI 35.45 kg/m²       Physical Exam  Vitals reviewed.   Constitutional:       Appearance: Normal appearance. He is normal weight.   Eyes:      Conjunctiva/sclera: Conjunctivae normal.   Cardiovascular:      Rate and Rhythm: Normal rate and regular rhythm.      Pulses: Normal pulses.      Heart sounds: Normal heart sounds.   Pulmonary:      Effort: Pulmonary effort is normal.      Breath sounds: Normal breath sounds.   Abdominal:      General: Bowel sounds are normal.      Palpations: Abdomen is soft.   Musculoskeletal:      Cervical back: Neck supple.   Skin:     General: Skin is warm and dry.      Capillary Refill: Capillary refill takes less than 2 seconds.   Neurological:      Mental Status: He is alert and oriented to person, place, and time.   Psychiatric:         Mood and Affect: Mood normal.         Behavior: Behavior normal.               No data to display                   No data to display                         Labs Reviewed:  Chemistry:  Lab Results   Component Value Date     07/03/2024    K 4.2 07/03/2024    BUN 15 07/03/2024    CREATININE 0.93 07/03/2024    EGFRNORACEVR 87 07/03/2024    GLUCOSE 276 (H) 07/03/2024    CALCIUM 8.6 07/03/2024    ALKPHOS 109 07/03/2024    LABPROT 7.1 07/03/2024    LABPROT 10.0 07/03/2024    ALBUMIN 4.0 07/03/2024    AST 21 07/03/2024    ALT 18 07/03/2024    MG 2.30 07/03/2024    TSH 0.880 07/03/2024    AUVGRP7VUIE 0.98 02/14/2024    PSA 0.34 06/25/2024        Lab Results   Component Value Date    HGBA1C 8.5 (H) 06/25/2024        Hematology:  Lab Results   Component Value Date    WBC 7.21 07/03/2024    RBC 5.02 07/03/2024    HGB 15.6 07/03/2024    HCT 46.3 07/03/2024    MCV 92.2 07/03/2024    MCH 31.1 07/03/2024    MCHC 33.7 07/03/2024    RDW 12.8 07/03/2024     07/03/2024    MPV 11.8 07/03/2024       Lipid Panel:  Lab Results "   Component Value Date    CHOL 154 07/03/2024    HDL 38 (L) 07/03/2024    LDLDIRECT 87.7 07/03/2024    TRIG 140 07/03/2024        Assessment & Plan:  1. Medicare annual wellness visit, subsequent    2. Type 2 diabetes mellitus without complication, with long-term current use of insulin  Comments:  cut Tresiba in half; start Ozempic  Overview:  On Farxiga, Tresiba, and Novolin @ mealtime > per Endocrinology      Assessment & Plan:  Diabetes labs:   Lab Results   Component Value Date    HGBA1C 8.5 (H) 06/25/2024      A1c is not at goal.  He has an appointment with endocrinology next month apparently but he reports he is unsure if that appointment is going to take place as they are having some changes with providers there.  We will start Ozempic 0.25 mg weekly, cut Tresiba to 26 units daily.  F/u 1 month.    Take all medications as directed; compliance is critical for managing your diabetes.   Follow American Diabetic Association (ADA) dietary guidelines for eating and implement exercise into your daily regimen.   Check your glucose levels each morning and record for review at follow up.      Orders:  -     semaglutide (OZEMPIC) 0.25 mg or 0.5 mg (2 mg/3 mL) pen injector; Inject 0.25 mg into the skin every 7 days.  Dispense: 3 mL; Refill: 2  -     Discontinue: insulin degludec (TRESIBA FLEXTOUCH U-200) 200 unit/mL (3 mL) insulin pen; Inject 26 Units into the skin once daily.  -     dapagliflozin propanediol (FARXIGA) 10 mg tablet; Take 1 tablet (10 mg total) by mouth every morning.  Dispense: 90 tablet; Refill: 3  -     insulin degludec (TRESIBA FLEXTOUCH U-200) 200 unit/mL (3 mL) insulin pen; Inject 26 Units into the skin once daily.  Dispense: 3.9 mL; Refill: 0  -     Comprehensive Metabolic Panel; Future; Expected date: 09/24/2024  -     Hemoglobin A1C; Future; Expected date: 09/24/2024  -     Hemoglobin A1C; Future; Expected date: 09/24/2025  -     Microalbumin/Creatinine Ratio, Urine; Future; Expected date:  09/24/2025    3. Primary hypertension  Overview:  On Lisinopril 20 mg daily.     Assessment & Plan:  Well controlled.   Continue current regimen.  Low Sodium Diet (DASH Diet - Less than 2 grams of sodium per day).  Monitor blood pressure daily and log. Report consistent numbers greater than 140/90.  Maintain healthy weight with goal BMI <30. Exercise 30 minutes per day, 5 days per week.        Orders:  -     lisinopriL (PRINIVIL,ZESTRIL) 20 MG tablet; Take 1 tablet (20 mg total) by mouth once daily.  Dispense: 90 tablet; Refill: 3  -     CBC Auto Differential; Future; Expected date: 09/24/2025  -     Comprehensive Metabolic Panel; Future; Expected date: 09/24/2025  -     TSH; Future; Expected date: 09/24/2025    4. Hypercholesterolemia  Overview:  On Rosuvastatin 20 mg daily.  Zetia 10 mg added (08/2024)      Assessment & Plan:  Lab Results   Component Value Date    CHOL 154 07/03/2024    HDL 38 (L) 07/03/2024    LDLDIRECT 87.7 07/03/2024    TRIG 140 07/03/2024     Continue Rosuvastatin + Zetia 10 mg daily (added by Cardiology)  LDL Goal: <70  Educated on dietary modifications. Follow a low cholesterol, low saturated fat diet with less that 200mg of cholesterol a day.  Avoid fried foods and high saturated fats.  Increase dietary fiber.  Regular exercise can reduce LDL (bad cholesterol) and raise HDL (good cholesterol). Encourage physical activity 5 times per week for 30 minutes per day.       Orders:  -     rosuvastatin (CRESTOR) 20 MG tablet; Take 1 tablet (20 mg total) by mouth once daily.  Dispense: 90 tablet; Refill: 3  -     Lipid Panel; Future; Expected date: 09/24/2025    5. Stenosis of right carotid artery  Overview:  On Rosuvastatin 20 mg daily. Zetia 10 mg daily added 08/2024    Assessment & Plan:  F/u Cardiology as scheduled.     Orders:  -     Lipid Panel; Future; Expected date: 09/24/2025    6. Class 2 severe obesity due to excess calories with serious comorbidity and body mass index (BMI) of 35.0 to  35.9 in adult  Assessment & Plan:  Body mass index is 35.45 kg/m².  Goal BMI <30.  Exercise 5 times a week for 30 minutes per day.  Avoid soda, simple sugars, excessive rice, potatoes or bread. Limit fast foods and fried foods.  Choose complex carbs in moderation (example: green vegetables, beans, oatmeal). Eat plenty of fresh fruits and vegetables with lean meats daily.  Do not skip meals. Eat a balanced portion size.  Avoid fad diets. Consider permanent healthy life style changes.       Orders:  -     TSH; Future; Expected date: 09/24/2025    7. Screening for malignant neoplasm of prostate  -     PSA, Screening; Future; Expected date: 09/24/2025         Colon Cancer Screening -  UTD  Eye Exam- Recommend annually.  Dental Exam- Recommend biannually.    Vaccinations:  Immunization History   Administered Date(s) Administered    COVID-19 MRNA, LN-S PF (MODERNA HALF 0.25 ML DOSE) 10/25/2021, 05/24/2022    COVID-19 Vaccine 01/27/2021, 02/26/2021, 10/25/2021, 05/24/2022    COVID-19, MRNA, LN-S, PF (MODERNA FULL 0.5 ML DOSE) 01/27/2021, 02/26/2021    COVID-19, mRNA, LNP-S, PF (Moderna 2023)Ages 12+ 10/13/2023    COVID-19, mRNA, LNP-S, bivalent booster, PF (PFIZER OMICRON) 10/05/2022    Influenza - Quadrivalent - High Dose - PF (65 years and older) 09/28/2020, 10/06/2021, 10/05/2022, 09/25/2023    Pneumococcal Conjugate - 13 Valent 10/26/2021    Pneumococcal Polysaccharide - 23 Valent 12/20/2022    Tdap 11/28/2020       Future Appointments   Date Time Provider Department Center   11/7/2024  8:00 AM Naina Spear FNP-C [facility]   9/25/2025  8:00 AM LAB, [facility] LABORATORY DRAW STATION [facility]   9/30/2025 10:30 AM Naina Spear FNP-C [facility]       Follow up for 1), DM, 6 wk f/u, Fasting labs prior, 2), 1 year, MCR wellness, Fasting Labs prior. Call sooner if needed.    Naina Spear, BERRY-C    Lab Frequency Next Occurrence   Ambulatory referral/consult to Cardiology  Once 07/19/2024        Medicare Annual Wellness and Personalized Prevention Plan:   Fall Risk + Home Safety + Hearing Impairment + Depression Screen + Opioid and Substance Abuse Screening + Cognitive Impairment Screen + Health Risk Assessment all reviewed.     Health Maintenance Topics with due status: Not Due       Topic Last Completion Date    TETANUS VACCINE 11/28/2020    Colorectal Cancer Screening 12/16/2021    Eye Exam 05/15/2024    Diabetes Urine Screening 06/25/2024    Lipid Panel 07/03/2024    High Dose Statin 07/12/2024      The patient's Health Maintenance was reviewed and the following appears to be due at this time:   Health Maintenance Due   Topic Date Due    Hepatitis C Screening  Never done    Foot Exam  Never done    LDCT Lung Screen  Never done    Shingles Vaccine (1 of 2) Never done    RSV Vaccine (Age 60+ and Pregnant patients) (1 - 1-dose 60+ series) Never done    Abdominal Aortic Aneurysm Screening  Never done    Influenza Vaccine (1) 09/01/2024    COVID-19 Vaccine (11 - 2023-24 season) 09/01/2024    Hemoglobin A1c  09/25/2024       A comprehensive HEALTH RISK ASSESSMENT was completed today. Results are summarized below:    There are NO EMOTIONAL/SOCIAL CONCERNS identified on today's screening for Social Isolation, Depression and Anxiety.    There are NO COGNITIVE FUNCTION CONCERNS identified on today's screening.  The following FUNCTIONAL AND/OR SAFETY CONCERNS were identified on today's screening for Physical Symptoms, Nutritional, Home Safety/Living Situation, Fall Risk, Activities of Daily Living, Independent Activities of Daily Living, Physical Activity,Timed Up and Go test and Whisper test::   *Patient reports he has FALLEN TWO OR MORE TIMES IN THE PAST YEAR. (Have you fallen two or more times in the past year?: (!) Yes)  *Patient reports NO PREVENTIVE HOME HAZARD EVALUATION OR MODIFICATION. (Have you or someone else evaluated or modified your home with additional safety features like  handrails on all stairs, installed grab bars in the bathroom, secured loose rugs and ensured good lighting in all areas?: (!) No)         The patient is NOT A TOBACCO USER.  The patient reports NO SIGNIFICANT ALCOHOL USE.     All Questions regarding food, transportation or housing were not answered today.    Provided patient with a 5-10 year written screening schedule and personal prevention plan. Recommendations were developed using the USPSTF age appropriate recommendations. Education, counseling, and referrals were provided as needed. After Visit Summary printed and given to patient, which includes a list of additional screenings\tests needed.    Advance Care Planning   I attest to discussing Advance Care Planning with patient and/or family member.  Education was provided including the importance of the Health Care Power of , Advance Directives, and/or [place] documentation.  The patient expressed understanding to the importance of this information and discussion.       Follow up for 1), DM, 6 wk f/u, Fasting labs prior, 2), 1 year, MCR wellness, Fasting Labs prior. In addition to their scheduled follow up, the patient has also been instructed to follow up on as needed basis.

## 2024-09-24 NOTE — ASSESSMENT & PLAN NOTE
Diabetes labs:   Lab Results   Component Value Date    HGBA1C 8.5 (H) 06/25/2024      A1c is not at goal.  He has an appointment with endocrinology next month apparently but he reports he is unsure if that appointment is going to take place as they are having some changes with providers there.  We will start Ozempic 0.25 mg weekly, cut Tresiba to 26 units daily.  F/u 1 month.    Take all medications as directed; compliance is critical for managing your diabetes.   Follow American Diabetic Association (ADA) dietary guidelines for eating and implement exercise into your daily regimen.   Check your glucose levels each morning and record for review at follow up.

## 2024-09-24 NOTE — ASSESSMENT & PLAN NOTE
Well controlled.   Continue current regimen.  Low Sodium Diet (DASH Diet - Less than 2 grams of sodium per day).  Monitor blood pressure daily and log. Report consistent numbers greater than 140/90.  Maintain healthy weight with goal BMI <30. Exercise 30 minutes per day, 5 days per week.

## 2024-09-26 ENCOUNTER — TELEPHONE (OUTPATIENT)
Dept: FAMILY MEDICINE | Facility: CLINIC | Age: 74
End: 2024-09-26
Payer: MEDICARE

## 2024-09-26 DIAGNOSIS — E11.9 TYPE 2 DIABETES MELLITUS WITHOUT COMPLICATION, WITH LONG-TERM CURRENT USE OF INSULIN: ICD-10-CM

## 2024-09-26 DIAGNOSIS — E78.00 HYPERCHOLESTEROLEMIA: Primary | ICD-10-CM

## 2024-09-26 DIAGNOSIS — Z79.4 TYPE 2 DIABETES MELLITUS WITHOUT COMPLICATION, WITH LONG-TERM CURRENT USE OF INSULIN: ICD-10-CM

## 2024-09-26 NOTE — TELEPHONE ENCOUNTER
He said that the current Tresiba prescription is roughly a 67 day rx and they won't split a box so it would cost $150+. He said that if it is ordered for 60u/day that would be about a box/mo which is more affordable. He was previously on 120u daily. Ozempic isn't covered by his insurance. He mentioned Zetia and said that it wasn't filled. Dr. Ramirez has prescribed it. Are you going to start prescribing it?     [facility]    [phone]

## 2024-09-26 NOTE — TELEPHONE ENCOUNTER
He said that he would like it to be one box/month which would make it 60u/day. If the dose is less, that is what makes it more than a box/month and the cost is too high. He is going to check with his insurance regarding GLP-1. He is asking for zetia #30 be sent as well.

## 2024-09-26 NOTE — TELEPHONE ENCOUNTER
When he was seen here he told me he took 52 units of the long-acting insulin.  I told him to decrease that in half, down to 26 units because we were starting him on Ozempic.  If he can not take the Ozempic he definitely needs to stay with his original dose of long-acting insulin.  Unless he is willing to pay for the Ozempic or if his insurance will cover another GLP 1 like Mounjaro or Victoza or Trulicity

## 2024-09-27 RX ORDER — EZETIMIBE 10 MG/1
10 TABLET ORAL DAILY
Qty: 30 TABLET | Refills: 3 | Status: SHIPPED | OUTPATIENT
Start: 2024-09-27

## 2024-09-27 RX ORDER — INSULIN DEGLUDEC 200 U/ML
60 INJECTION, SOLUTION SUBCUTANEOUS DAILY
Qty: 9 ML | Refills: 3 | Status: SHIPPED | OUTPATIENT
Start: 2024-09-27

## 2024-10-14 ENCOUNTER — LAB VISIT (OUTPATIENT)
Dept: LAB | Facility: HOSPITAL | Age: 74
End: 2024-10-14
Attending: INTERNAL MEDICINE
Payer: MEDICARE

## 2024-10-14 DIAGNOSIS — E78.5 HYPERLIPIDEMIA, UNSPECIFIED HYPERLIPIDEMIA TYPE: ICD-10-CM

## 2024-10-14 DIAGNOSIS — Z79.4 ENCOUNTER FOR LONG-TERM (CURRENT) USE OF INSULIN: ICD-10-CM

## 2024-10-14 DIAGNOSIS — I10 HYPERTENSION, UNSPECIFIED TYPE: ICD-10-CM

## 2024-10-14 DIAGNOSIS — E11.65 INADEQUATELY CONTROLLED DIABETES MELLITUS: Primary | ICD-10-CM

## 2024-10-14 LAB
ALBUMIN SERPL-MCNC: 4 G/DL (ref 3.4–5)
ALBUMIN/GLOB SERPL: 1.6 RATIO
ALP SERPL-CCNC: 102 UNIT/L (ref 50–144)
ALT SERPL-CCNC: 31 UNIT/L (ref 1–45)
ANION GAP SERPL CALC-SCNC: 7 MEQ/L (ref 2–13)
AST SERPL-CCNC: 26 UNIT/L (ref 17–59)
BILIRUB SERPL-MCNC: 0.7 MG/DL (ref 0–1)
BUN SERPL-MCNC: 15 MG/DL (ref 7–20)
CALCIUM SERPL-MCNC: 9.6 MG/DL (ref 8.4–10.2)
CHLORIDE SERPL-SCNC: 104 MMOL/L (ref 98–110)
CO2 SERPL-SCNC: 27 MMOL/L (ref 21–32)
CREAT SERPL-MCNC: 0.88 MG/DL (ref 0.66–1.25)
CREAT/UREA NIT SERPL: 17 (ref 12–20)
EST. AVERAGE GLUCOSE BLD GHB EST-MCNC: 226 MG/DL (ref 70–115)
GFR SERPLBLD CREATININE-BSD FMLA CKD-EPI: >90 ML/MIN/1.73/M2
GLOBULIN SER-MCNC: 2.5 GM/DL (ref 2–3.9)
GLUCOSE SERPL-MCNC: 111 MG/DL (ref 70–115)
HBA1C MFR BLD: 9.5 % (ref 4–6)
POTASSIUM SERPL-SCNC: 4.3 MMOL/L (ref 3.5–5.1)
PROT SERPL-MCNC: 6.5 GM/DL (ref 6.3–8.2)
SODIUM SERPL-SCNC: 138 MMOL/L (ref 136–145)

## 2024-10-14 PROCEDURE — 83036 HEMOGLOBIN GLYCOSYLATED A1C: CPT

## 2024-10-14 PROCEDURE — 80053 COMPREHEN METABOLIC PANEL: CPT

## 2024-10-14 PROCEDURE — 36415 COLL VENOUS BLD VENIPUNCTURE: CPT

## 2024-11-06 ENCOUNTER — PATIENT OUTREACH (OUTPATIENT)
Dept: ADMINISTRATIVE | Facility: HOSPITAL | Age: 74
End: 2024-11-06
Payer: MEDICARE

## 2025-03-10 ENCOUNTER — OFFICE VISIT (OUTPATIENT)
Dept: FAMILY MEDICINE | Facility: CLINIC | Age: 75
End: 2025-03-10
Payer: MEDICARE

## 2025-03-10 ENCOUNTER — HOSPITAL ENCOUNTER (INPATIENT)
Facility: HOSPITAL | Age: 75
LOS: 3 days | Discharge: HOME | DRG: 419 | End: 2025-03-13
Attending: OTOLARYNGOLOGY | Admitting: INTERNAL MEDICINE
Payer: MEDICARE

## 2025-03-10 VITALS
TEMPERATURE: 98 F | HEIGHT: 67 IN | DIASTOLIC BLOOD PRESSURE: 68 MMHG | WEIGHT: 224 LBS | HEART RATE: 107 BPM | OXYGEN SATURATION: 95 % | BODY MASS INDEX: 35.16 KG/M2 | SYSTOLIC BLOOD PRESSURE: 120 MMHG

## 2025-03-10 DIAGNOSIS — R07.81 RIB PAIN ON RIGHT SIDE: ICD-10-CM

## 2025-03-10 DIAGNOSIS — R10.9 ABDOMINAL PAIN, UNSPECIFIED ABDOMINAL LOCATION: ICD-10-CM

## 2025-03-10 DIAGNOSIS — K81.0 ACUTE CHOLECYSTITIS: Primary | ICD-10-CM

## 2025-03-10 DIAGNOSIS — Z01.818 PRE-OP EVALUATION: ICD-10-CM

## 2025-03-10 DIAGNOSIS — R05.9 COUGH, UNSPECIFIED TYPE: ICD-10-CM

## 2025-03-10 DIAGNOSIS — Z01.818 PREOP TESTING: ICD-10-CM

## 2025-03-10 DIAGNOSIS — B34.9 VIRAL SYNDROME: Primary | ICD-10-CM

## 2025-03-10 DIAGNOSIS — R11.10 VOMITING, UNSPECIFIED VOMITING TYPE, UNSPECIFIED WHETHER NAUSEA PRESENT: ICD-10-CM

## 2025-03-10 LAB
ALBUMIN SERPL-MCNC: 4.1 G/DL (ref 3.4–5)
ALBUMIN SERPL-MCNC: 4.1 G/DL (ref 3.4–5)
ALBUMIN/GLOB SERPL: 1.3 RATIO
ALBUMIN/GLOB SERPL: 1.6 RATIO
ALP SERPL-CCNC: 131 UNIT/L (ref 50–144)
ALP SERPL-CCNC: 98 UNIT/L (ref 50–144)
ALT SERPL-CCNC: 23 UNIT/L (ref 1–45)
ALT SERPL-CCNC: 26 UNIT/L (ref 1–45)
ANION GAP SERPL CALC-SCNC: 15 MEQ/L (ref 2–13)
ANION GAP SERPL CALC-SCNC: 17 MEQ/L (ref 2–13)
AST SERPL-CCNC: 35 UNIT/L (ref 17–59)
AST SERPL-CCNC: 36 UNIT/L (ref 17–59)
BASOPHILS # BLD AUTO: 0.02 X10(3)/MCL (ref 0.01–0.08)
BASOPHILS # BLD AUTO: 0.03 X10(3)/MCL (ref 0.01–0.08)
BASOPHILS NFR BLD AUTO: 0.1 % (ref 0.1–1.2)
BASOPHILS NFR BLD AUTO: 0.1 % (ref 0.1–1.2)
BILIRUB SERPL-MCNC: 1.9 MG/DL (ref 0–1)
BILIRUB SERPL-MCNC: 1.9 MG/DL (ref 0–1)
BILIRUB UR QL STRIP.AUTO: ABNORMAL
BUN SERPL-MCNC: 19 MG/DL (ref 7–20)
BUN SERPL-MCNC: 21 MG/DL (ref 7–20)
CALCIUM SERPL-MCNC: 9.2 MG/DL (ref 8.4–10.2)
CALCIUM SERPL-MCNC: 9.3 MG/DL (ref 8.4–10.2)
CHLORIDE SERPL-SCNC: 96 MMOL/L (ref 98–110)
CHLORIDE SERPL-SCNC: 97 MMOL/L (ref 98–110)
CLARITY UR: CLEAR
CO2 SERPL-SCNC: 19 MMOL/L (ref 21–32)
CO2 SERPL-SCNC: 22 MMOL/L (ref 21–32)
COLOR UR AUTO: YELLOW
CREAT SERPL-MCNC: 0.88 MG/DL (ref 0.66–1.25)
CREAT SERPL-MCNC: 0.98 MG/DL (ref 0.66–1.25)
CREAT/UREA NIT SERPL: 21 (ref 12–20)
CREAT/UREA NIT SERPL: 22 (ref 12–20)
CTP QC/QA: YES
EOSINOPHIL # BLD AUTO: 0.03 X10(3)/MCL (ref 0.04–0.54)
EOSINOPHIL # BLD AUTO: 0.16 X10(3)/MCL (ref 0.04–0.54)
EOSINOPHIL NFR BLD AUTO: 0.1 % (ref 0.7–7)
EOSINOPHIL NFR BLD AUTO: 0.8 % (ref 0.7–7)
ERYTHROCYTE [DISTWIDTH] IN BLOOD BY AUTOMATED COUNT: 13.2 %
ERYTHROCYTE [DISTWIDTH] IN BLOOD BY AUTOMATED COUNT: 13.2 %
FLUAV AG UPPER RESP QL IA.RAPID: NOT DETECTED
FLUBV AG UPPER RESP QL IA.RAPID: NOT DETECTED
GFR SERPLBLD CREATININE-BSD FMLA CKD-EPI: 81 ML/MIN/1.73/M2
GFR SERPLBLD CREATININE-BSD FMLA CKD-EPI: 90 ML/MIN/1.73/M2
GLOBULIN SER-MCNC: 2.5 GM/DL (ref 2–3.9)
GLOBULIN SER-MCNC: 3.2 GM/DL (ref 2–3.9)
GLUCOSE SERPL-MCNC: 176 MG/DL (ref 70–115)
GLUCOSE SERPL-MCNC: 218 MG/DL (ref 70–115)
GLUCOSE UR QL STRIP: >=1000
HCT VFR BLD AUTO: 47.5 % (ref 36–52)
HCT VFR BLD AUTO: 50.2 % (ref 36–52)
HGB BLD-MCNC: 16 G/DL (ref 13–18)
HGB BLD-MCNC: 16.5 G/DL (ref 13–18)
HGB UR QL STRIP: NEGATIVE
IMM GRANULOCYTES # BLD AUTO: 0.05 X10(3)/MCL (ref 0–0.03)
IMM GRANULOCYTES # BLD AUTO: 0.09 X10(3)/MCL (ref 0–0.03)
IMM GRANULOCYTES NFR BLD AUTO: 0.2 % (ref 0–0.5)
IMM GRANULOCYTES NFR BLD AUTO: 0.4 % (ref 0–0.5)
KETONES UR QL STRIP: >=80
LEUKOCYTE ESTERASE UR QL STRIP: NEGATIVE
LYMPHOCYTES # BLD AUTO: 1.26 X10(3)/MCL (ref 1.32–3.57)
LYMPHOCYTES # BLD AUTO: 1.35 X10(3)/MCL (ref 1.32–3.57)
LYMPHOCYTES NFR BLD AUTO: 5.8 % (ref 20–55)
LYMPHOCYTES NFR BLD AUTO: 6.1 % (ref 20–55)
MCH RBC QN AUTO: 30.1 PG (ref 27–34)
MCH RBC QN AUTO: 30.7 PG (ref 27–34)
MCHC RBC AUTO-ENTMCNC: 32.9 G/DL (ref 31–37)
MCHC RBC AUTO-ENTMCNC: 33.7 G/DL (ref 31–37)
MCV RBC AUTO: 91 FL (ref 79–99)
MCV RBC AUTO: 91.6 FL (ref 79–99)
MONOCYTES # BLD AUTO: 1.44 X10(3)/MCL (ref 0.3–0.82)
MONOCYTES # BLD AUTO: 1.82 X10(3)/MCL (ref 0.3–0.82)
MONOCYTES NFR BLD AUTO: 7 % (ref 4.7–12.5)
MONOCYTES NFR BLD AUTO: 7.8 % (ref 4.7–12.5)
NEUTROPHILS # BLD AUTO: 17.72 X10(3)/MCL (ref 1.78–5.38)
NEUTROPHILS # BLD AUTO: 19.99 X10(3)/MCL (ref 1.78–5.38)
NEUTROPHILS NFR BLD AUTO: 85.8 % (ref 37–73)
NEUTROPHILS NFR BLD AUTO: 85.8 % (ref 37–73)
NITRITE UR QL STRIP: NEGATIVE
NRBC BLD AUTO-RTO: 0 %
PH UR STRIP: 6 [PH]
PLATELET # BLD AUTO: 145 X10(3)/MCL (ref 140–371)
PLATELET # BLD AUTO: 173 X10(3)/MCL (ref 140–371)
PMV BLD AUTO: 11.5 FL (ref 9.4–12.4)
PMV BLD AUTO: 12.4 FL (ref 9.4–12.4)
POC MOLECULAR INFLUENZA A AGN: NEGATIVE
POC MOLECULAR INFLUENZA B AGN: NEGATIVE
POTASSIUM SERPL-SCNC: 4.6 MMOL/L (ref 3.5–5.1)
POTASSIUM SERPL-SCNC: 4.6 MMOL/L (ref 3.5–5.1)
PROT SERPL-MCNC: 6.6 GM/DL (ref 6.3–8.2)
PROT SERPL-MCNC: 7.3 GM/DL (ref 6.3–8.2)
PROT UR QL STRIP: ABNORMAL
RBC # BLD AUTO: 5.22 X10(6)/MCL (ref 4–6)
RBC # BLD AUTO: 5.48 X10(6)/MCL (ref 4–6)
SARS-COV-2 RNA RESP QL NAA+PROBE: NOT DETECTED
SODIUM SERPL-SCNC: 133 MMOL/L (ref 136–145)
SODIUM SERPL-SCNC: 133 MMOL/L (ref 136–145)
SP GR UR STRIP.AUTO: 1.02 (ref 1–1.03)
UROBILINOGEN UR STRIP-ACNC: 0.2
WBC # BLD AUTO: 20.65 X10(3)/MCL (ref 4–11.5)
WBC # BLD AUTO: 23.31 X10(3)/MCL (ref 4–11.5)

## 2025-03-10 PROCEDURE — 81003 URINALYSIS AUTO W/O SCOPE: CPT | Performed by: OTOLARYNGOLOGY

## 2025-03-10 PROCEDURE — 99215 OFFICE O/P EST HI 40 MIN: CPT | Mod: ,,, | Performed by: NURSE PRACTITIONER

## 2025-03-10 PROCEDURE — 87400 INFLUENZA A/B EACH AG IA: CPT | Mod: 59,QW,RHCUB | Performed by: NURSE PRACTITIONER

## 2025-03-10 PROCEDURE — 99285 EMERGENCY DEPT VISIT HI MDM: CPT | Mod: 25

## 2025-03-10 PROCEDURE — 96361 HYDRATE IV INFUSION ADD-ON: CPT

## 2025-03-10 PROCEDURE — 25000003 PHARM REV CODE 250: Performed by: OTOLARYNGOLOGY

## 2025-03-10 PROCEDURE — 96368 THER/DIAG CONCURRENT INF: CPT

## 2025-03-10 PROCEDURE — 63600175 PHARM REV CODE 636 W HCPCS: Performed by: OTOLARYNGOLOGY

## 2025-03-10 PROCEDURE — 25000003 PHARM REV CODE 250: Performed by: NURSE PRACTITIONER

## 2025-03-10 PROCEDURE — 85025 COMPLETE CBC W/AUTO DIFF WBC: CPT | Performed by: NURSE PRACTITIONER

## 2025-03-10 PROCEDURE — 0240U COVID/FLU A&B PCR: CPT | Performed by: NURSE PRACTITIONER

## 2025-03-10 PROCEDURE — 87400 INFLUENZA A/B EACH AG IA: CPT | Mod: QW,RHCUB | Performed by: NURSE PRACTITIONER

## 2025-03-10 PROCEDURE — 96365 THER/PROPH/DIAG IV INF INIT: CPT

## 2025-03-10 PROCEDURE — 11000001 HC ACUTE MED/SURG PRIVATE ROOM

## 2025-03-10 PROCEDURE — 80053 COMPREHEN METABOLIC PANEL: CPT | Performed by: NURSE PRACTITIONER

## 2025-03-10 RX ORDER — LANOLIN ALCOHOL/MO/W.PET/CERES
5000 CREAM (GRAM) TOPICAL DAILY
Status: DISCONTINUED | OUTPATIENT
Start: 2025-03-11 | End: 2025-03-13 | Stop reason: HOSPADM

## 2025-03-10 RX ORDER — ONDANSETRON 4 MG/1
4 TABLET, ORALLY DISINTEGRATING ORAL EVERY 8 HOURS PRN
Status: DISCONTINUED | OUTPATIENT
Start: 2025-03-10 | End: 2025-03-10

## 2025-03-10 RX ORDER — METRONIDAZOLE 500 MG/100ML
500 INJECTION, SOLUTION INTRAVENOUS
Status: COMPLETED | OUTPATIENT
Start: 2025-03-10 | End: 2025-03-10

## 2025-03-10 RX ORDER — SODIUM CHLORIDE 9 MG/ML
1000 INJECTION, SOLUTION INTRAVENOUS
Status: COMPLETED | OUTPATIENT
Start: 2025-03-10 | End: 2025-03-10

## 2025-03-10 RX ORDER — ASPIRIN 81 MG/1
81 TABLET ORAL DAILY
Status: DISCONTINUED | OUTPATIENT
Start: 2025-03-11 | End: 2025-03-13 | Stop reason: HOSPADM

## 2025-03-10 RX ORDER — LISINOPRIL 10 MG/1
20 TABLET ORAL DAILY
Status: DISCONTINUED | OUTPATIENT
Start: 2025-03-11 | End: 2025-03-13 | Stop reason: HOSPADM

## 2025-03-10 RX ORDER — EZETIMIBE 10 MG/1
10 TABLET ORAL DAILY
Status: DISCONTINUED | OUTPATIENT
Start: 2025-03-11 | End: 2025-03-11

## 2025-03-10 RX ORDER — SODIUM CHLORIDE 0.9 % (FLUSH) 0.9 %
10 SYRINGE (ML) INJECTION
Status: CANCELLED | OUTPATIENT
Start: 2025-03-10

## 2025-03-10 RX ORDER — TALC
6 POWDER (GRAM) TOPICAL NIGHTLY PRN
Status: CANCELLED | OUTPATIENT
Start: 2025-03-10

## 2025-03-10 RX ORDER — METRONIDAZOLE 500 MG/100ML
500 INJECTION, SOLUTION INTRAVENOUS
Status: CANCELLED | OUTPATIENT
Start: 2025-03-10

## 2025-03-10 RX ORDER — ONDANSETRON 4 MG/1
4 TABLET, ORALLY DISINTEGRATING ORAL EVERY 8 HOURS PRN
Qty: 20 TABLET | Refills: 0 | Status: SHIPPED | OUTPATIENT
Start: 2025-03-10

## 2025-03-10 RX ORDER — ROSUVASTATIN CALCIUM 10 MG/1
10 TABLET, COATED ORAL DAILY
COMMUNITY
Start: 2024-10-22 | End: 2025-03-10 | Stop reason: CLARIF

## 2025-03-10 RX ADMIN — SODIUM CHLORIDE 1000 ML: 9 INJECTION, SOLUTION INTRAVENOUS at 09:03

## 2025-03-10 RX ADMIN — SODIUM CHLORIDE 1000 ML: 9 INJECTION, SOLUTION INTRAVENOUS at 07:03

## 2025-03-10 RX ADMIN — PIPERACILLIN AND TAZOBACTAM 4.5 G: 4; .5 INJECTION, POWDER, FOR SOLUTION INTRAVENOUS; PARENTERAL at 09:03

## 2025-03-10 RX ADMIN — METRONIDAZOLE 500 MG: 500 INJECTION, SOLUTION INTRAVENOUS at 10:03

## 2025-03-10 NOTE — PROGRESS NOTES
Patient ID: [id number]     Chief Complaint: Emesis and Cough (X 3 days)      HPI:     González Soni is a 75 y.o. male in the office for Emesis and Cough (X 3 days)  Started feeling ill about 3 days ago, started with diarrhea followed by vomiting.  His wife started feeling bad on the same day with URI symptoms including cough, ear pain, sore throat, body aches.  Since the first episode of vomiting, he developed pain on his right side over his ribs.  Pain exacerbated by movement and deep breathing.  He denies SOB and wheezing.  No fever or chills.  He has no appetite and no desire to drink.  Has had about 10 oz liquid in the last 24 hours.  He is a diabetic but has not checked his blood sugar in a day or two because he's just been laying in bed, sleeping.      Past Medical History:  has a past medical history of Diabetes mellitus, type 2, Essential tremor, Family history of colon cancer, Hypercholesterolemia, Hypertension, and Pernicious anemia.    Social History:  reports that he quit smoking about 11 years ago. His smoking use included cigarettes, pipe, and cigars. He started smoking about 49 years ago. He has a 38.8 pack-year smoking history. He has never used smokeless tobacco. He reports that he does not currently use alcohol after a past usage of about 3.0 standard drinks of alcohol per week. He reports that he does not use drugs.    Current Outpatient Medications   Medication Instructions    aspirin (ECOTRIN) 81 mg, Oral, Daily    CONTOUR NEXT TEST STRIPS Strp Use as directed to check blood glucose FOUR TIMES A DAY.    cyanocobalamin (VITAMIN B-12) 5,000 mcg, Daily    dapagliflozin propanediol (FARXIGA) 10 mg, Oral, Every morning    ezetimibe (ZETIA) 10 mg, Oral, Daily    insulin aspart, niacinamide, (FIASP FLEXTOUCH U-100 INSULIN SUBQ)     insulin degludec (TRESIBA FLEXTOUCH U-200) 60 Units, Subcutaneous, Daily    lisinopriL (PRINIVIL,ZESTRIL) 20 mg, Oral, Daily    ondansetron (ZOFRAN-ODT) 4 mg, Oral, Every  "8 hours PRN    rosuvastatin (CRESTOR) 10 mg, Daily    TECHLITE PEN NEEDLE 32 gauge x 5/32" Ndle USE with insulin FOUR TIMES A DAY       Patient has no known allergies.     Patient Care Team:  Naina Spear FNP-C as PCP - General (Family Medicine)  Deb Stoll MA as Care Coordinator     Subjective     Review of Systems    See HPI     Objective     Visit Vitals  /68 (BP Location: Right arm)   Pulse 107   Temp 97.9 °F (36.6 °C) (Temporal)   Ht 5' 7.01" (1.702 m)   Wt 101.6 kg (224 lb)   SpO2 95%   BMI 35.07 kg/m²       Physical Exam  Vitals reviewed.   Constitutional:       General: He is not in acute distress.     Appearance: Normal appearance. He is normal weight. He is ill-appearing.   Eyes:      Conjunctiva/sclera: Conjunctivae normal.   Cardiovascular:      Rate and Rhythm: Normal rate and regular rhythm.      Pulses: Normal pulses.   Pulmonary:      Effort: Pulmonary effort is normal.      Breath sounds: Normal breath sounds.   Abdominal:      General: Bowel sounds are normal.      Palpations: Abdomen is soft.      Tenderness: There is abdominal tenderness.   Musculoskeletal:      Cervical back: Neck supple.      Right lower leg: No edema.      Left lower leg: No edema.   Skin:     General: Skin is warm and dry.      Capillary Refill: Capillary refill takes less than 2 seconds.      Findings: No rash.   Neurological:      Mental Status: He is alert and oriented to person, place, and time.   Psychiatric:         Mood and Affect: Mood normal.         Behavior: Behavior normal.         Assessment & Plan:     1. Viral syndrome  Comments:  push oral hydration.  labs today.  ER precautions given, will call with results.  Orders:  -     ondansetron (ZOFRAN-ODT) 4 MG TbDL; Take 1 tablet (4 mg total) by mouth every 8 (eight) hours as needed (nausea and vomiting).  Dispense: 20 tablet; Refill: 0  -     CBC Auto Differential; Future; Expected date: 03/10/2025  -     Comprehensive Metabolic Panel; Future; " Expected date: 03/10/2025    2. Vomiting, unspecified vomiting type, unspecified whether nausea present  -     POCT Influenza A/B Molecular  -     Cancel: POCT COVID-19 Rapid Screening    3. Cough, unspecified type  -     POCT Influenza A/B Molecular  -     Cancel: POCT COVID-19 Rapid Screening       Follow up if symptoms worsen or fail to improve. In addition to their next scheduled appointment, the patient has also been instructed to follow up on as needed basis.     Future Appointments   Date Time Provider Department Center   9/25/2025  8:00 AM LAB, [facility] LABORATORY DRAW STATION [facility]   9/30/2025 10:30 AM Naina Spear FNP-C [facility] I spent a total of 49 minutes on the day of the visit.  This includes face to face time and non-face to face time preparing to see the patient (eg, review of tests), obtaining and/or reviewing separately obtained history, documenting clinical information in the electronic or other health record, independently interpreting results and communicating results to the patient/family/caregiver, or care coordinator.         This case was reviewed and discussed with Dr. Moreno once labs returned at 1800.  The patient was notified by me at 1805 to present to the emergency room for further workup for his elevated WBC and electrolyte abnormalities.

## 2025-03-10 NOTE — ED PROVIDER NOTES
Encounter Date: 3/10/2025       History     Chief Complaint   Patient presents with    Abnormal Labs     Pt reports went to PCP due to N/V x 2 days, labs were drawn and WBC 23.31, told to come to ER. Hx: DM.     75 year old male presents with elevated WBC's from visiting PCP's office today.  Review of labs work shows WBC/s 23,000.  Patient has not had much to drink over the past 2 days and feels dehydrated.  C/o right sided rib pain since vomiting on Saturday.  Needs help dressing and doing ADL's due to pain.     The history is provided by the patient. No  was used.     Review of patient's allergies indicates:  No Known Allergies  Past Medical History:   Diagnosis Date    Diabetes mellitus, type 2     Essential tremor     Family history of colon cancer     Hypercholesterolemia     Hypertension     Pernicious anemia      Past Surgical History:   Procedure Laterality Date    CATARACT EXTRACTION Right 04/16/2021    CATARACT EXTRACTION Left 04/02/2021    COLONOSCOPY  12/16/2021    EYE SURGERY  8-2021    cataract surgeries    FRACTURE SURGERY      right leg, tibia and fibia    retina rupture repair Left 2018     Family History   Problem Relation Name Age of Onset    Colon cancer Mother Vesta Soni     Cancer Mother Vesta Soni     Diabetes Mother Vesta Soni     Heart disease Father Bernabe Soni     Prostate cancer Father Bernabe Soni     Cancer Father Bernabe Soni     Cancer Maternal Grandfather Jamil Griggs      Social History[1]  Review of Systems   Musculoskeletal:  Positive for arthralgias.   All other systems reviewed and are negative.      Physical Exam     Initial Vitals [03/10/25 1855]   BP Pulse Resp Temp SpO2   (!) 155/83 (!) 112 18 98.3 °F (36.8 °C) 95 %      MAP       --         Physical Exam    Nursing note and vitals reviewed.  Constitutional: He appears well-developed and well-nourished.   HENT:   Head: Normocephalic and atraumatic.  Mouth/Throat: Mucous membranes are normal.   Eyes: EOM are normal. Pupils are equal, round, and reactive to light.   Neck: Neck supple.   Normal range of motion.  Cardiovascular:  Normal rate, regular rhythm, normal heart sounds and intact distal pulses.           Pulmonary/Chest: Breath sounds normal.   Abdominal: Abdomen is soft. Bowel sounds are normal.   Musculoskeletal:         General: Normal range of motion.      Cervical back: Normal range of motion and neck supple.     Neurological: He is alert and oriented to person, place, and time. He has normal strength.   Skin: Skin is warm and dry. Capillary refill takes less than 2 seconds.   Psychiatric: He has a normal mood and affect. His behavior is normal. Judgment and thought content normal.         ED Course   Procedures  Labs Reviewed   COMPREHENSIVE METABOLIC PANEL - Abnormal       Result Value    Sodium 133 (*)     Potassium 4.6      Chloride 96 (*)     CO2 22      Glucose 218 (*)     Blood Urea Nitrogen 21 (*)     Creatinine 0.98      Calcium 9.2      Protein Total 7.3      Albumin 4.1      Globulin 3.2      Albumin/Globulin Ratio 1.3      Bilirubin Total 1.9 (*)     ALP 98      ALT 26      AST 35      eGFR 81      Anion Gap 15.0 (*)     BUN/Creatinine Ratio 21 (*)    CBC WITH DIFFERENTIAL - Abnormal    WBC 20.65 (*)     RBC 5.48      Hgb 16.5      Hct 50.2      MCV 91.6      MCH 30.1      MCHC 32.9      RDW 13.2      Platelet 145      MPV 11.5      Neut % 85.8 (*)     Lymph % 6.1 (*)     Mono % 7.0      Eos % 0.8      Basophil % 0.1      Lymph # 1.26 (*)     Neut # 17.72 (*)     Mono # 1.44 (*)     Eos # 0.16      Baso # 0.02      Imm Gran # 0.05 (*)     Imm Grans % 0.2     URINALYSIS - Abnormal    Color, UA Yellow      Appearance, UA Clear      Specific Gravity, UA 1.025      pH, UA 6.0      Protein, UA Trace (*)     Glucose, UA >=1000 (*)     Ketones, UA >=80 (*)     Blood, UA Negative      Bilirubin, UA Small (*)     Urobilinogen, UA 0.2      Nitrites,  UA Negative      Leukocyte Esterase, UA Negative      Narrative:      URINE STABILITY IS 2 HOURS AT ROOM TEMP OR    SIX HOURS REFRIGERATED. PERFORMING TESTING ON    SPECIMENS GREATER THAN THIS AGE MAY AFFECT THE    FOLLOWING TESTS:    PH          SPECIFIC GRAVITY           BLOOD    CLARITY     BILIRUBIN               UROBILINOGEN   COVID/FLU A&B PCR - Normal    Influenza A PCR Not Detected      Influenza B PCR Not Detected      SARS-CoV-2 PCR Not Detected      Narrative:     The Xpert Xpress SARS-CoV-2/FLU/RSV plus is a rapid, multiplexed real-time PCR test intended for the simultaneous qualitative detection and differentiation of SARS-CoV-2, Influenza A, Influenza B, and respiratory syncytial virus (RSV) viral RNA in either nasopharyngeal swab or nasal swab specimens.         CBC W/ AUTO DIFFERENTIAL    Narrative:     The following orders were created for panel order CBC auto differential.  Procedure                               Abnormality         Status                     ---------                               -----------         ------                     CBC with Differential[[id number]]       Abnormal            Final result                 Please view results for these tests on the individual orders.        ECG Results    None       Imaging Results              CT Abdomen Pelvis  Without Contrast (Final result)  Result time 03/10/25 21:07:02      Final result by Shukri Rocha MD (03/10/25 21:07:02)                   Impression:        1. A 2 cm calcified gallstone is present in the neck of the gallbladder and is associated with thickening of the gallbladder wall and Meghan cholecystic edema which extends into the duodenal bulb and 1st portion of the duodenum and I cannot exclude that there is involvement of the atrophic pancreatic head with some stranding extending from the gallbladder fossa along the inferior aspect of the right lobe of the liver into the right paracolic gutter with some associated  colitis.  The pancreas is diffusely atrophic. I suspect that this represents cholelithiasis, impacted in the neck of the gallbladder with acute cholecystitis with some involvement of the posterior lung bases.    n/a    CATEGORY: n/a    The following dose reduction techniques are used for all CT at [facility]:    1.   Automated exposure control.    2.   Adjustment of the mA and/or kV according to patient size.    3.   Use of iterative reconstruction technique.      Electronically signed by: Shukri Rocha  Date:    03/10/2025  Time:    21:07               Narrative:    EXAMINATION:  CT ABDOMEN PELVIS WITHOUT CONTRAST    CLINICAL HISTORY:  ruq pain, xray states possible choley;    TECHNIQUE:  Low dose axial images, sagittal and coronal reformations were obtained from the lung bases to the pubic symphysis.  Oral contrast was not administered.    COMPARISON:  None    FINDINGS:  Bilateral posterior basilar interstitial coarsening is present, more on the right than left.    Liver:  No clinically significant abnormalities noted.    Gallbladder/Biliary System:  A 2 cm calcified gallstone is present in the neck of the gallbladder and is associated with thickening of the gallbladder wall and Meghan cholecystic edema which extends into the duodenal bulb and 1st portion of the duodenum and I suspect that there is involvement of the atrophic pancreatic head with some stranding extending from the gallbladder fossa along the inferior aspect of the right lobe of the liver into the right paracolic gutter.  The pancreas is diffusely atrophic.  I suspect that this represents cholelithiasis with acute cholecystitis.    Spleen:  No clinically significant abnormalities noted.    Adrenal glands:  No clinically significant abnormalities noted.    Pancreas:  The pancreas shows diffuse extensive atrophy.    Kidneys/Urinary Tract:  Both kidneys demonstrate mild Meghan renal stranding.    Urinary bladder:  No clinically  significant abnormalities noted.    Prostate gland/uterus and ovaries:  No clinically significant abnormalities noted.    GI tract:  There is indistinctness and stranding involving the duodenal bulb and 1st portion of the duodenum which is associated with the pathology involving the gallbladder.  There is stranding involving the right paracolic gutter which appears to be direct extension from the gallbladder pathology.  The appendix is not identified.    Vascular structures:   Mild to moderate atherosclerotic calcification is present involving the aorta.    Musculoskeletal structures:  Mild to moderate bony demineralization is present with moderate degenerative findings noted throughout the spine and to a lesser extent the SI joints and the hip joints.    Miscellaneous: The inguinal canals are filled with fat compatible with potential inguinal hernias, more evident on the right.                                       X-Ray Ribs 2 View Right (Final result)  Result time 03/10/25 20:03:14      Final result by Shukri Rocha MD (03/10/25 20:03:14)                   Impression:        1.  FOCAL WIDENING OF THE POSTEROLATERAL ASPECT OF THE RIGHT 9TH RIB WHICH I SUSPECT REPRESENTS AN OLD HEALED FRACTURE DEFORMITY.    2.   NO ACUTE DISPLACED FRACTURES OR OTHER SIGNIFICANT ABNORMALITIES NOTED.    3.  I SUSPECT CHOLELITHIASIS.      Electronically signed by: Shukri Rocha  Date:    03/10/2025  Time:    20:03               Narrative:    EXAMINATION:  XR RIBS 2 VIEW RIGHT    CLINICAL HISTORY:  Pleurodynia    TECHNIQUE:  Two views of the right ribs were performed.    COMPARISON:  None    FINDINGS:  There is of focal widening of the posterolateral aspect of the right 9th rib which I suspect represents an old healed fracture.  I see no definite acute displaced rib fractures or complications of rib fractures such as a hemothorax, pneumothorax or subcutaneous emphysema.                                       Medications   aspirin EC  tablet 81 mg (81 mg Oral Not Given 3/11/25 0900)   cyanocobalamin tablet 5,000 mcg (5,000 mcg Oral Not Given 3/11/25 0900)   lisinopriL tablet 20 mg (20 mg Oral Not Given 3/11/25 0900)   dextrose 50% injection 12.5 g (has no administration in time range)   glucagon (human recombinant) injection 1 mg (has no administration in time range)   insulin aspart U-100 pen 0-5 Units (has no administration in time range)   insulin glargine U-100 (Lantus) pen 40 Units (40 Units Subcutaneous Not Given 3/11/25 0900)   0.9% NaCl infusion ( Intravenous New Bag 3/11/25 0540)   acetaminophen tablet 650 mg (has no administration in time range)   ondansetron injection 4 mg (4 mg Intravenous Given 3/11/25 1701)   calcium carbonate 200 mg calcium (500 mg) chewable tablet 1,000 mg (has no administration in time range)   docusate sodium capsule 100 mg (has no administration in time range)   morphine injection 2 mg (has no administration in time range)   naloxone 0.4 mg/mL injection 0.4 mg (has no administration in time range)   metronidazole IVPB 500 mg (0 mg Intravenous Stopped 3/11/25 1414)   piperacillin-tazobactam (ZOSYN) 4.5 g in D5W 100 mL IVPB (MB+) (0 g Intravenous Stopped 3/11/25 1827)   ceFOXItin injection 2 g (has no administration in time range)   sodium chloride 0.9% bolus 1,000 mL 1,000 mL (0 mLs Intravenous Stopped 3/10/25 2028)   piperacillin-tazobactam (ZOSYN) 4.5 g in D5W 100 mL IVPB (MB+) (0 g Intravenous Stopped 3/10/25 2214)   0.9% NaCl infusion (1,000 mLs Intravenous New Bag 3/10/25 2141)   metronidazole IVPB 500 mg (0 mg Intravenous Stopped 3/10/25 2322)   perflutren lipid microspheres injection 2 mL (2 mLs Intravenous Given 3/11/25 1010)     Medical Decision Making  Amount and/or Complexity of Data Reviewed  Labs: ordered.  Radiology: ordered.    Risk  OTC drugs.  Prescription drug management.  Decision regarding hospitalization.                                      Clinical Impression:  Final diagnoses:  [R07.81]  Rib pain on right side  [K81.0] Acute cholecystitis (Primary)  [R10.9] Abdominal pain, unspecified abdominal location          ED Disposition Condition    Admit Stable                  Emilie Leos MD  [phone]       Emilie Leos MD  25 [phone]       Emilie Leos MD  [phone]         [1]   Social History  Tobacco Use    Smoking status: Former     Current packs/day: 0.00     Average packs/day: 1 pack/day for 38.8 years (38.8 ttl pk-yrs)     Types: Cigarettes, Pipe, Cigars     Start date: 1975     Quit date: 3/1/2014     Years since quittin.0    Smokeless tobacco: Never   Substance Use Topics    Alcohol use: Not Currently     Alcohol/week: 3.0 standard drinks of alcohol     Types: 3 Cans of beer per week    Drug use: Never        Emilie Leos MD  [phone]

## 2025-03-11 ENCOUNTER — ANESTHESIA EVENT (OUTPATIENT)
Dept: SURGERY | Facility: HOSPITAL | Age: 75
End: 2025-03-11
Payer: MEDICARE

## 2025-03-11 ENCOUNTER — ANESTHESIA (OUTPATIENT)
Dept: SURGERY | Facility: HOSPITAL | Age: 75
End: 2025-03-11
Payer: MEDICARE

## 2025-03-11 PROBLEM — R10.11 RIGHT UPPER QUADRANT ABDOMINAL PAIN: Status: ACTIVE | Noted: 2025-03-11

## 2025-03-11 LAB
ALBUMIN SERPL-MCNC: 3.1 G/DL (ref 3.4–5)
ALBUMIN/GLOB SERPL: 1.3 RATIO
ALP SERPL-CCNC: 95 UNIT/L (ref 50–144)
ALT SERPL-CCNC: 17 UNIT/L (ref 1–45)
ANION GAP SERPL CALC-SCNC: 9 MEQ/L (ref 2–13)
AORTIC VALVE CUSP SEPERATION: 1.9 CM
APICAL FOUR CHAMBER EJECTION FRACTION: 64 %
AST SERPL-CCNC: 22 UNIT/L (ref 17–59)
AV INDEX (PROSTH): 0.8
AV MEAN GRADIENT: 7 MMHG
AV PEAK GRADIENT: 14 MMHG
AV VALVE AREA BY VELOCITY RATIO: 1.8 CM²
AV VALVE AREA: 2.5 CM²
AV VELOCITY RATIO: 0.58
BASOPHILS # BLD AUTO: 0.02 X10(3)/MCL (ref 0.01–0.08)
BASOPHILS NFR BLD AUTO: 0.1 % (ref 0.1–1.2)
BILIRUB SERPL-MCNC: 1.4 MG/DL (ref 0–1)
BSA FOR ECHO PROCEDURE: 2.2 M2
BUN SERPL-MCNC: 20 MG/DL (ref 7–20)
CALCIUM SERPL-MCNC: 8.6 MG/DL (ref 8.4–10.2)
CHLORIDE SERPL-SCNC: 103 MMOL/L (ref 98–110)
CO2 SERPL-SCNC: 23 MMOL/L (ref 21–32)
CREAT SERPL-MCNC: 0.81 MG/DL (ref 0.66–1.25)
CREAT/UREA NIT SERPL: 25 (ref 12–20)
CV ECHO LV RWT: 0.43 CM
DOP CALC AO PEAK VEL: 1.9 M/S
DOP CALC AO VTI: 34.6 CM
DOP CALC LVOT AREA: 3.1 CM2
DOP CALC LVOT DIAMETER: 2 CM
DOP CALC LVOT PEAK VEL: 1.1 M/S
DOP CALC LVOT STROKE VOLUME: 87 CM3
DOP CALCLVOT PEAK VEL VTI: 27.7 CM
E WAVE DECELERATION TIME: 197 MSEC
E/A RATIO: 0.58
E/E' RATIO: 6 M/S
ECHO LV POSTERIOR WALL: 1 CM (ref 0.6–1.1)
EOSINOPHIL # BLD AUTO: 0.01 X10(3)/MCL (ref 0.04–0.54)
EOSINOPHIL NFR BLD AUTO: 0.1 % (ref 0.7–7)
ERYTHROCYTE [DISTWIDTH] IN BLOOD BY AUTOMATED COUNT: 13.3 %
FRACTIONAL SHORTENING: 30.4 % (ref 28–44)
GFR SERPLBLD CREATININE-BSD FMLA CKD-EPI: >90 ML/MIN/1.73/M2
GLOBULIN SER-MCNC: 2.3 GM/DL (ref 2–3.9)
GLUCOSE SERPL-MCNC: 121 MG/DL (ref 70–115)
HCT VFR BLD AUTO: 44.9 % (ref 36–52)
HGB BLD-MCNC: 14.5 G/DL (ref 13–18)
IMM GRANULOCYTES # BLD AUTO: 0.04 X10(3)/MCL (ref 0–0.03)
IMM GRANULOCYTES NFR BLD AUTO: 0.2 % (ref 0–0.5)
INTERVENTRICULAR SEPTUM: 1 CM (ref 0.6–1.1)
LEFT ATRIUM SIZE: 3.3 CM
LEFT ATRIUM VOLUME INDEX MOD: 22 ML/M2
LEFT ATRIUM VOLUME MOD: 48 ML
LEFT INTERNAL DIMENSION IN SYSTOLE: 3.2 CM (ref 2.1–4)
LEFT VENTRICLE DIASTOLIC VOLUME INDEX: 45.79 ML/M2
LEFT VENTRICLE DIASTOLIC VOLUME: 98 ML
LEFT VENTRICLE END DIASTOLIC VOLUME APICAL 4 CHAMBER: 76 ML
LEFT VENTRICLE END SYSTOLIC VOLUME APICAL 2 CHAMBER: 44.9 ML
LEFT VENTRICLE END SYSTOLIC VOLUME APICAL 4 CHAMBER: 43.1 ML
LEFT VENTRICLE MASS INDEX: 74.2 G/M2
LEFT VENTRICLE SYSTOLIC VOLUME INDEX: 18.2 ML/M2
LEFT VENTRICLE SYSTOLIC VOLUME: 39 ML
LEFT VENTRICULAR INTERNAL DIMENSION IN DIASTOLE: 4.6 CM (ref 3.5–6)
LEFT VENTRICULAR MASS: 158.8 G
LV LATERAL E/E' RATIO: 6 M/S
LV SEPTAL E/E' RATIO: 5.5 M/S
LVED V (TEICH): 98.3 ML
LVES V (TEICH): 39.4 ML
LVOT MG: 2.2 MMHG
LVOT MV: 0.69 CM/S
LYMPHOCYTES # BLD AUTO: 1.2 X10(3)/MCL (ref 1.32–3.57)
LYMPHOCYTES NFR BLD AUTO: 7.3 % (ref 20–55)
MCH RBC QN AUTO: 29.9 PG (ref 27–34)
MCHC RBC AUTO-ENTMCNC: 32.3 G/DL (ref 31–37)
MCV RBC AUTO: 92.6 FL (ref 79–99)
MONOCYTES # BLD AUTO: 1.25 X10(3)/MCL (ref 0.3–0.82)
MONOCYTES NFR BLD AUTO: 7.6 % (ref 4.7–12.5)
MV PEAK A VEL: 1.04 M/S
MV PEAK E VEL: 0.6 M/S
NEUTROPHILS # BLD AUTO: 13.95 X10(3)/MCL (ref 1.78–5.38)
NEUTROPHILS NFR BLD AUTO: 84.7 % (ref 37–73)
OHS QRS DURATION: 82 MS
OHS QTC CALCULATION: 418 MS
PISA TR MAX VEL: 2.3 M/S
PLATELET # BLD AUTO: 140 X10(3)/MCL (ref 140–371)
PMV BLD AUTO: 11.5 FL (ref 9.4–12.4)
POCT GLUCOSE: 130 MG/DL (ref 70–110)
POCT GLUCOSE: 176 MG/DL (ref 70–110)
POCT GLUCOSE: 185 MG/DL (ref 70–110)
POCT GLUCOSE: 232 MG/DL (ref 70–110)
POTASSIUM SERPL-SCNC: 4 MMOL/L (ref 3.5–5.1)
PROT SERPL-MCNC: 5.4 GM/DL (ref 6.3–8.2)
RBC # BLD AUTO: 4.85 X10(6)/MCL (ref 4–6)
SODIUM SERPL-SCNC: 135 MMOL/L (ref 136–145)
TDI LATERAL: 0.1 M/S
TDI SEPTAL: 0.11 M/S
TDI: 0.11 M/S
TR MAX PG: 21 MMHG
TRICUSPID ANNULAR PLANE SYSTOLIC EXCURSION: 2.2 CM
WBC # BLD AUTO: 16.47 X10(3)/MCL (ref 4–11.5)
Z-SCORE OF LEFT VENTRICULAR DIMENSION IN END DIASTOLE: -4.03
Z-SCORE OF LEFT VENTRICULAR DIMENSION IN END SYSTOLE: -2.14

## 2025-03-11 PROCEDURE — 36000709 HC OR TIME LEV III EA ADD 15 MIN: Performed by: SURGERY

## 2025-03-11 PROCEDURE — 36415 COLL VENOUS BLD VENIPUNCTURE: CPT | Performed by: SURGERY

## 2025-03-11 PROCEDURE — 25500020 PHARM REV CODE 255: Performed by: INTERNAL MEDICINE

## 2025-03-11 PROCEDURE — C1729 CATH, DRAINAGE: HCPCS | Performed by: SURGERY

## 2025-03-11 PROCEDURE — 36000708 HC OR TIME LEV III 1ST 15 MIN: Performed by: SURGERY

## 2025-03-11 PROCEDURE — 87070 CULTURE OTHR SPECIMN AEROBIC: CPT | Performed by: SURGERY

## 2025-03-11 PROCEDURE — 25000003 PHARM REV CODE 250: Performed by: INTERNAL MEDICINE

## 2025-03-11 PROCEDURE — 71000033 HC RECOVERY, INTIAL HOUR: Performed by: SURGERY

## 2025-03-11 PROCEDURE — 25000003 PHARM REV CODE 250: Performed by: NURSE ANESTHETIST, CERTIFIED REGISTERED

## 2025-03-11 PROCEDURE — 37000009 HC ANESTHESIA EA ADD 15 MINS: Performed by: SURGERY

## 2025-03-11 PROCEDURE — 0FT44ZZ RESECTION OF GALLBLADDER, PERCUTANEOUS ENDOSCOPIC APPROACH: ICD-10-PCS | Performed by: SURGERY

## 2025-03-11 PROCEDURE — 63600175 PHARM REV CODE 636 W HCPCS: Performed by: NURSE ANESTHETIST, CERTIFIED REGISTERED

## 2025-03-11 PROCEDURE — 37000008 HC ANESTHESIA 1ST 15 MINUTES: Performed by: SURGERY

## 2025-03-11 PROCEDURE — 93010 ELECTROCARDIOGRAM REPORT: CPT | Mod: ,,, | Performed by: INTERNAL MEDICINE

## 2025-03-11 PROCEDURE — 63600175 PHARM REV CODE 636 W HCPCS: Performed by: SURGERY

## 2025-03-11 PROCEDURE — 63600175 PHARM REV CODE 636 W HCPCS: Performed by: INTERNAL MEDICINE

## 2025-03-11 PROCEDURE — 85025 COMPLETE CBC W/AUTO DIFF WBC: CPT | Performed by: SURGERY

## 2025-03-11 PROCEDURE — 93005 ELECTROCARDIOGRAM TRACING: CPT

## 2025-03-11 PROCEDURE — D9220A PRA ANESTHESIA: Mod: ,,, | Performed by: NURSE ANESTHETIST, CERTIFIED REGISTERED

## 2025-03-11 PROCEDURE — 11000001 HC ACUTE MED/SURG PRIVATE ROOM

## 2025-03-11 PROCEDURE — 87205 SMEAR GRAM STAIN: CPT | Performed by: SURGERY

## 2025-03-11 PROCEDURE — 87075 CULTR BACTERIA EXCEPT BLOOD: CPT | Performed by: SURGERY

## 2025-03-11 PROCEDURE — 27201423 OPTIME MED/SURG SUP & DEVICES STERILE SUPPLY: Performed by: SURGERY

## 2025-03-11 PROCEDURE — 25000003 PHARM REV CODE 250: Performed by: SURGERY

## 2025-03-11 PROCEDURE — 80053 COMPREHEN METABOLIC PANEL: CPT | Performed by: SURGERY

## 2025-03-11 RX ORDER — INSULIN GLARGINE 100 [IU]/ML
40 INJECTION, SOLUTION SUBCUTANEOUS DAILY
Status: DISCONTINUED | OUTPATIENT
Start: 2025-03-11 | End: 2025-03-13 | Stop reason: HOSPADM

## 2025-03-11 RX ORDER — LIDOCAINE HYDROCHLORIDE AND EPINEPHRINE 10; 10 UG/ML; MG/ML
INJECTION, SOLUTION INFILTRATION; PERINEURAL
Status: DISCONTINUED | OUTPATIENT
Start: 2025-03-11 | End: 2025-03-11 | Stop reason: HOSPADM

## 2025-03-11 RX ORDER — ONDANSETRON HYDROCHLORIDE 2 MG/ML
4 INJECTION, SOLUTION INTRAVENOUS EVERY 6 HOURS PRN
Status: DISCONTINUED | OUTPATIENT
Start: 2025-03-11 | End: 2025-03-13 | Stop reason: HOSPADM

## 2025-03-11 RX ORDER — ACETAMINOPHEN 325 MG/1
650 TABLET ORAL EVERY 6 HOURS PRN
Status: DISCONTINUED | OUTPATIENT
Start: 2025-03-11 | End: 2025-03-13 | Stop reason: HOSPADM

## 2025-03-11 RX ORDER — VECURONIUM BROMIDE 1 MG/ML
INJECTION, POWDER, LYOPHILIZED, FOR SOLUTION INTRAVENOUS
Status: DISCONTINUED | OUTPATIENT
Start: 2025-03-11 | End: 2025-03-11

## 2025-03-11 RX ORDER — SODIUM CHLORIDE 9 MG/ML
INJECTION, SOLUTION INTRAVENOUS CONTINUOUS
Status: ACTIVE | OUTPATIENT
Start: 2025-03-11 | End: 2025-03-12

## 2025-03-11 RX ORDER — DOCUSATE SODIUM 100 MG/1
100 CAPSULE, LIQUID FILLED ORAL 2 TIMES DAILY PRN
Status: DISCONTINUED | OUTPATIENT
Start: 2025-03-11 | End: 2025-03-13 | Stop reason: HOSPADM

## 2025-03-11 RX ORDER — CALCIUM CARBONATE 200(500)MG
1000 TABLET,CHEWABLE ORAL 3 TIMES DAILY PRN
Status: DISCONTINUED | OUTPATIENT
Start: 2025-03-11 | End: 2025-03-13 | Stop reason: HOSPADM

## 2025-03-11 RX ORDER — LIDOCAINE HYDROCHLORIDE 20 MG/ML
INJECTION INTRAVENOUS
Status: DISCONTINUED | OUTPATIENT
Start: 2025-03-11 | End: 2025-03-11

## 2025-03-11 RX ORDER — GLUCAGON 1 MG
1 KIT INJECTION
Status: DISCONTINUED | OUTPATIENT
Start: 2025-03-11 | End: 2025-03-13 | Stop reason: HOSPADM

## 2025-03-11 RX ORDER — INSULIN ASPART 100 [IU]/ML
0-5 INJECTION, SOLUTION INTRAVENOUS; SUBCUTANEOUS EVERY 6 HOURS PRN
Status: DISCONTINUED | OUTPATIENT
Start: 2025-03-11 | End: 2025-03-13 | Stop reason: HOSPADM

## 2025-03-11 RX ORDER — CEFOXITIN 2 G/1
2 INJECTION, POWDER, FOR SOLUTION INTRAVENOUS
Status: DISCONTINUED | OUTPATIENT
Start: 2025-03-11 | End: 2025-03-12

## 2025-03-11 RX ORDER — MORPHINE SULFATE 2 MG/ML
2 INJECTION, SOLUTION INTRAMUSCULAR; INTRAVENOUS EVERY 4 HOURS PRN
Status: DISCONTINUED | OUTPATIENT
Start: 2025-03-11 | End: 2025-03-13 | Stop reason: HOSPADM

## 2025-03-11 RX ORDER — KETOROLAC TROMETHAMINE 30 MG/ML
INJECTION, SOLUTION INTRAMUSCULAR; INTRAVENOUS
Status: DISCONTINUED | OUTPATIENT
Start: 2025-03-11 | End: 2025-03-11

## 2025-03-11 RX ORDER — FENTANYL CITRATE 50 UG/ML
INJECTION, SOLUTION INTRAMUSCULAR; INTRAVENOUS
Status: DISCONTINUED | OUTPATIENT
Start: 2025-03-11 | End: 2025-03-11

## 2025-03-11 RX ORDER — NEOSTIGMINE METHYLSULFATE 1 MG/ML
INJECTION INTRAVENOUS
Status: DISCONTINUED | OUTPATIENT
Start: 2025-03-11 | End: 2025-03-11

## 2025-03-11 RX ORDER — METRONIDAZOLE 500 MG/100ML
500 INJECTION, SOLUTION INTRAVENOUS
Status: DISCONTINUED | OUTPATIENT
Start: 2025-03-11 | End: 2025-03-13 | Stop reason: HOSPADM

## 2025-03-11 RX ORDER — METRONIDAZOLE 500 MG/100ML
500 INJECTION, SOLUTION INTRAVENOUS
Status: DISCONTINUED | OUTPATIENT
Start: 2025-03-11 | End: 2025-03-11

## 2025-03-11 RX ORDER — NALOXONE HCL 0.4 MG/ML
0.4 VIAL (ML) INJECTION
Status: DISCONTINUED | OUTPATIENT
Start: 2025-03-11 | End: 2025-03-13 | Stop reason: HOSPADM

## 2025-03-11 RX ORDER — PROPOFOL 10 MG/ML
VIAL (ML) INTRAVENOUS
Status: DISCONTINUED | OUTPATIENT
Start: 2025-03-11 | End: 2025-03-11

## 2025-03-11 RX ADMIN — GLYCOPYRROLATE 0.2 MG: 0.2 INJECTION, SOLUTION INTRAMUSCULAR; INTRAVITREAL at 05:03

## 2025-03-11 RX ADMIN — METRONIDAZOLE 500 MG: 5 INJECTION, SOLUTION INTRAVENOUS at 08:03

## 2025-03-11 RX ADMIN — INSULIN ASPART 1 UNITS: 100 INJECTION, SOLUTION INTRAVENOUS; SUBCUTANEOUS at 09:03

## 2025-03-11 RX ADMIN — PIPERACILLIN AND TAZOBACTAM 4.5 G: 4; .5 INJECTION, POWDER, FOR SOLUTION INTRAVENOUS; PARENTERAL at 09:03

## 2025-03-11 RX ADMIN — FENTANYL CITRATE 50 MCG: 50 INJECTION, SOLUTION INTRAMUSCULAR; INTRAVENOUS at 03:03

## 2025-03-11 RX ADMIN — LIDOCAINE HYDROCHLORIDE 50 MG: 20 INJECTION, SOLUTION INTRAVENOUS at 03:03

## 2025-03-11 RX ADMIN — VECURONIUM BROMIDE 1 MG: 10 INJECTION, POWDER, FOR SOLUTION INTRAVENOUS at 04:03

## 2025-03-11 RX ADMIN — ONDANSETRON 4 MG: 2 INJECTION INTRAMUSCULAR; INTRAVENOUS at 05:03

## 2025-03-11 RX ADMIN — VECURONIUM BROMIDE 7 MG: 10 INJECTION, POWDER, FOR SOLUTION INTRAVENOUS at 03:03

## 2025-03-11 RX ADMIN — KETOROLAC TROMETHAMINE 30 MG: 30 INJECTION, SOLUTION INTRAMUSCULAR; INTRAVENOUS at 05:03

## 2025-03-11 RX ADMIN — PERFLUTREN 2 ML: 6.52 INJECTION, SUSPENSION INTRAVENOUS at 10:03

## 2025-03-11 RX ADMIN — CEFOXITIN 2 G: 1 INJECTION, POWDER, FOR SOLUTION INTRAVENOUS at 03:03

## 2025-03-11 RX ADMIN — PIPERACILLIN AND TAZOBACTAM 4.5 G: 4; .5 INJECTION, POWDER, FOR SOLUTION INTRAVENOUS; PARENTERAL at 05:03

## 2025-03-11 RX ADMIN — SODIUM CHLORIDE: 9 INJECTION, SOLUTION INTRAVENOUS at 12:03

## 2025-03-11 RX ADMIN — PROPOFOL 140 MG: 10 INJECTION, EMULSION INTRAVENOUS at 03:03

## 2025-03-11 RX ADMIN — FENTANYL CITRATE 100 MCG: 50 INJECTION, SOLUTION INTRAMUSCULAR; INTRAVENOUS at 03:03

## 2025-03-11 RX ADMIN — SODIUM CHLORIDE: 9 INJECTION, SOLUTION INTRAVENOUS at 05:03

## 2025-03-11 RX ADMIN — NEOSTIGMINE METHYLSULFATE 2 MG: 0.5 INJECTION INTRAVENOUS at 05:03

## 2025-03-11 RX ADMIN — PIPERACILLIN AND TAZOBACTAM 4.5 G: 4; .5 INJECTION, POWDER, FOR SOLUTION INTRAVENOUS; PARENTERAL at 02:03

## 2025-03-11 RX ADMIN — FENTANYL CITRATE 50 MCG: 50 INJECTION, SOLUTION INTRAMUSCULAR; INTRAVENOUS at 04:03

## 2025-03-11 RX ADMIN — METRONIDAZOLE 500 MG: 5 INJECTION, SOLUTION INTRAVENOUS at 01:03

## 2025-03-11 RX ADMIN — METRONIDAZOLE 500 MG: 5 INJECTION, SOLUTION INTRAVENOUS at 04:03

## 2025-03-11 NOTE — HOSPITAL COURSE
03/11/2025 Pt admitted for RUQ pain thought to be cholecysitis, Surgery consulted and cardiac clearance has been ordered and pending this am.  C/o persistent RUQ pain.  03/12/2025 s/p lap amandeep, temp 100.1 overnight discussed with Dr. Mason agree with continuing iv abx x 24-48hrs to reduce fever,  gangrenouse cholecystitis noted at time of surgery.  03/13/2025 DISCHARGE SUMMARY: Pt afebrile overnight last 24 hr tmax 99.1 he is feeling much better, tolerating diet, 70cc from GABO drain overnight.  He is anxious for d/c home.  Will d/c home with 10 d of flagyl and levaquin per surgery request and he will f/u with Dr. Mason to remove the drain.   Implemented All Fall with Harm Risk Interventions:  [facility] to call system. Call bell, personal items and telephone within reach. Instruct patient to call for assistance. Room bathroom lighting operational. Non-slip footwear when patient is off stretcher. Physically safe environment: no spills, clutter or unnecessary equipment. Stretcher in lowest position, wheels locked, appropriate side rails in place. Provide visual cue, wrist band, yellow gown, etc. Monitor gait and stability. Monitor for mental status changes and reorient to person, place, and time. Review medications for side effects contributing to fall risk. Reinforce activity limits and safety measures with patient and family. Provide visual clues: red socks.

## 2025-03-11 NOTE — ANESTHESIA PROCEDURE NOTES
Intubation    Date/Time: 3/11/2025 3:16 PM    Performed by: David Nolasco CRNA  Authorized by: David Nolasco CRNA    Intubation:     Induction:  Intravenous    Intubated:  Postinduction    Mask Ventilation:  Easy mask    Attempts:  1    Attempted By:  CRNA    Method of Intubation:  Direct    Blade:  Tripp 2    Laryngeal View Grade: Grade I - full view of cords      Difficult Airway Encountered?: No      Airway Device:  Oral endotracheal tube    Airway Device Size:  7.5    Style/Cuff Inflation:  Cuffed    Tube secured:  21    Secured at:  The lips    Placement Verified By:  Capnometry    Complicating Factors:  None    Findings Post-Intubation:  BS equal bilateral and atraumatic/condition of teeth unchanged

## 2025-03-11 NOTE — ASSESSMENT & PLAN NOTE
Body mass index is 33.79 kg/m². Morbid obesity complicates all aspects of disease management from diagnostic modalities to treatment. Weight loss encouraged and health benefits explained to patient.

## 2025-03-11 NOTE — NURSING
Pt returned from surgery via bed in stable condition, Report received from DEISY Cesar to JAMIE Pascal, pt surgical sites intact with GABO present right upper quadrant, pt alert and oriented with wife at bedside with no s/s of distress.

## 2025-03-11 NOTE — PLAN OF CARE
03/11/25 0844   Discharge Assessment   Assessment Type Discharge Planning Assessment   Confirmed/corrected address, phone number and insurance Yes   Confirmed Demographics Correct on Facesheet   Source of Information patient;family   When was your last doctors appointment? 03/10/25   Communicated JANEL with patient/caregiver Yes   Reason For Admission gallbladder   People in Home spouse   Facility Arrived From: home   Do you expect to return to your current living situation? Yes   Do you have help at home or someone to help you manage your care at home? Yes   Who are your caregiver(s) and their phone number(s)? spouse Kathleen   Prior to hospitilization cognitive status: Alert/Oriented   Current cognitive status: Alert/Oriented   Walking or Climbing Stairs Difficulty no   Dressing/Bathing Difficulty no   Home Accessibility not wheelchair accessible   Equipment Currently Used at Home none   Readmission within 30 days? No   Patient currently being followed by outpatient case management? No   Do you currently have service(s) that help you manage your care at home? No   Do you take prescription medications? Yes   Do you have prescription coverage? Yes   Coverage medicare   Do you have any problems affording any of your prescribed medications? No   Is the patient taking medications as prescribed? yes   Who is going to help you get home at discharge? wife Kathleen   How do you get to doctors appointments? car, drives self   Are you on dialysis? No   Do you take coumadin? No   Discharge Plan A Home   Discharge Plan B Home   DME Needed Upon Discharge  none   Discharge Plan discussed with: Spouse/sig other;Patient   Name(s) and Number(s) Kathleen (spouse)   Transition of Care Barriers None   Physical Activity   On average, how many days per week do you engage in moderate to strenuous exercise (like a brisk walk)? 0 days   On average, how many minutes do you engage in exercise at this level? 0 min   Financial Resource Strain   How  hard is it for you to pay for the very basics like food, housing, medical care, and heating? Not very   Housing Stability   In the last 12 months, was there a time when you were not able to pay the mortgage or rent on time? N   At any time in the past 12 months, were you homeless or living in a shelter (including now)? N   Transportation Needs   Has the lack of transportation kept you from medical appointments, meetings, work or from getting things needed for daily living? No   Food Insecurity   Within the past 12 months, you worried that your food would run out before you got the money to buy more. Never true   Within the past 12 months, the food you bought just didn't last and you didn't have money to get more. Never true   Stress   Do you feel stress - tense, restless, nervous, or anxious, or unable to sleep at night because your mind is troubled all the time - these days? Not at all   Social Isolation   How often do you feel lonely or isolated from those around you?  Never   Alcohol Use   Q1: How often do you have a drink containing alcohol? Never   Q2: How many drinks containing alcohol do you have on a typical day when you are drinking? None   Q3: How often do you have six or more drinks on one occasion? Never   Utilities   In the past 12 months has the electric, gas, oil, or water company threatened to shut off services in your home? No   Health Literacy   How often do you need to have someone help you when you read instructions, pamphlets, or other written material from your doctor or pharmacy? Never   OTHER   Name(s) of People in Home martina Wang

## 2025-03-11 NOTE — H&P
Active Problem List:  -Sepsis  -Acute cholecystitis  -Hypertension  -Hyperlipidemia   -Pernicious anemia  -Uncontrolled insulin-dependent diabetes mellitus type 2  -Obesity BMI of 34.2    Medical Decision Making 03/10/2025:  -Patient is being placed on observation services for a diagnosis of acute cholecystitis.    SIRS Criteria: (greater or equal 2 meets SIRS Criteria)  No:  Temperature >38'C or <36'C  Yes: HR > 90bpm  No: RR >20 or PaCO2 <32mmHg  Yes: WBC>12k or <4k or >10% bands    Sepsis Criteria: (SIRS + Source of Infection)  Source of Infection: Intra-abdominal, Acute Cholecystitis     Severe Sepsis Criteria (Organ Dysfunction, Hypotension, hypoperfusion)  No: Lactic Acidosis, SBP<90 or SBP>40mmHg of normal  No: Organ Dysfunction      -T. bili slightly elevated but no increase in AST/ALT.  Obtain a right upper quadrant ultrasound to investigate further involvement between gallbladder/pancreas.  Otherwise continue with IV Zosyn 3.375 every 6 hours.  N.p.o. at midnight.  Insulin sliding scale with Accu-Cheks every 6 hours.    -General Surgery requested patient undergo cardiac clearance.  Patient had a stress test 6 months ago that was negative.  Patient follows with cardiology.  No history of congestive heart failure.  But patient is likely to have preoperative treatment with insulin.  Renal function is intact.  Patient likely to undergo a laparoscopic cholecystectomy so it is not a high risk surgery. RCRI is 1 w/ a 6.0% chance of Major Cardiac Event.  On a daily basis patient has METS around 4-5.      -atrophic pancreas, no recent h/o alcohol abuse, daily alcohol use    Diet: N.p.o. at midnight  DVT Prophy: SCDs only, patient undergoing surgery in the morning hold off on VTE anticoagulation  Disposition: Patient likely to be hospitalized less than 48 hours    As clarification, on 03/10/2025, patient should be admitted for hospital observation services under my care.    This encounter was completed via  telemedicine (audio/video) with nursing at bedside to assist with clinical exam. SOC Audio/Visual Equipment is using HIPAA compliant web platform.    Participants on call: Bedside RN, patient, physician on-call, Wife    Patient Location: [place], [place]  Provider Location: [place]  Physician on call: Bob Lyons MD  Seen in emergency room awaiting bed placement: Yes  Status of patient: Observation    Patient aware of remote access and use of Telemedicine and agrees to continue with care.     Bob Lyons MD  Internal Medicine Hospitalist  Date of service: 03/10/2025    ===========================================  Chief complaint: Nausea, vomiting and abdominal pain  Allergies: No known drug allergies  CODE STATUS: Full code    History:  Patient is a 75-year-old man with history of hyperlipidemia, hypertension, diabetes mellitus type 2 who had arrived at the emergency room with complaints of nausea vomiting abdominal discomfort.    On Saturday patient had gone to a [employer]inner with a group of people that evening patient started to have some issues of right upper quadrant abdominal pain, sharp stabbing, rib pain made worse with breathing with associated symptoms of nausea and multiple episodes of nonbilious none hematemesis emesis.  Patient denies any fevers or chills but has had some persistent intermittent pain in that region.  He is also complained of some mid to lower back pain within the same area as well as multiple episodes of diarrhea.  However, he has not had any issues of diarrhea in the past 24 hours but persistent abdominal discomfort with continued nausea and vomiting.  Patient also has intermittent cough, body aches and some ear pain.    Patient states that nothing makes it worse other than deep breathing.  He has a decreased appetite.  Denies having similar symptoms or problems with indigestion after eating fatty foods in the past.  Patient denies any alcohol use for over 30  "years.    Initial vitals in the emergency room 36.8 °C, 112 bpm, 18 respirations a minute, 155/83 mmHg 95% on room air.  Patient weighs 102.1 kg.    Review of patient's laboratory studies reveal patient to have a white blood cell count of 23.3, hemoglobin of 16, MCV of 91, platelet count of 173.  Neutrophil predominance with a left shift of 85.8%.    Patient has a sodium of 133, potassium 4.6, chloride 97, carbon dioxide 19, BUN/creatinine 19/0.88.  Glucose of 176.  Total protein/albumin 6.6/4.1.  T. bili elevated at 1.9.  AST/ALT 36/23 with a normal alkaline phosphatase of 131.    Back in October patient's last hemoglobin A1c was elevated at 9.5.  Influenza A/B and COVID-19 testing were negative.  Urinalysis shows no evidence of UTI but does show evidence of glucosuria.    CT of the abdomen and pelvis without IV contrast reveals patient to have evidence of acute cholecystitis.    Patient is admitted to observation services for issues of acute cholecystitis.  ===========================================  Physical Exam:   (Clinical exam was done via telemedicine with nursing at bedside to assist with clinical exam, some portions of clinical exam from emergency room provider/nursing was used for reference)    /60   Pulse 101   Temp 98.7 °F (37.1 °C) (Oral)   Resp 18   Ht 5' 8" (1.727 m)   Wt 102.1 kg (225 lb)   SpO2 95%   BMI 34.21 kg/m²     General: Mild distress, looks uncomfortable  HEENT: NCAT, EOMI, Moist Mucous Membranes  CV: S1S2 w/ RRR, (-) MRC, peripheral pulses intact and symmetrical  Resp: CTA w/o wrr symmetrical chest rise  GI: Soft but TTP in RUQ w/o guarding rebound  Musculoskeletal: MAL, (+) pitting pedal edema  Skin: No obvious rash or lesion, normal skin color, appropriate skin turgor, dry  Neuro: No acute/new focal/gross neurological deficits appreciated, no facial asymmetry or weakness noted during interview/exam  Psych: Alert and oriented x3, appropriate mood and " affect    Labs/imaging/medications/vitals/relevant electronic medical records have personally been reviewed by me    Patient screened for food insecurity, housing instability, transportation needs, utility difficulties, and  interpersonal safety.  \  Medications Ordered Prior to Encounter[1]  Past Medical History:   Diagnosis Date    Diabetes mellitus, type 2     Essential tremor     Family history of colon cancer     Hypercholesterolemia     Hypertension     Pernicious anemia      Past Surgical History:   Procedure Laterality Date    CATARACT EXTRACTION Right 04/16/2021    CATARACT EXTRACTION Left 04/02/2021    COLONOSCOPY  12/16/2021    EYE SURGERY  8-2021    cataract surgeries    FRACTURE SURGERY      right leg, tibia and fibia    retina rupture repair Left 2018     Social History[2]  Family History   Problem Relation Name Age of Onset    Colon cancer Mother Vesta Soni     Cancer Mother Vesta Soni     Diabetes Mother Vesta Soni     Heart disease Father Bernabe Soni     Prostate cancer Father Bernabe Soni     Cancer Father Bernabe Soni     Cancer Maternal Grandfather Jamil Griggs             [1]   No current facility-administered medications on file prior to encounter.     Current Outpatient Medications on File Prior to Encounter   Medication Sig Dispense Refill    aspirin (ECOTRIN) 81 MG EC tablet Take 1 tablet (81 mg total) by mouth once daily.      CONTOUR NEXT TEST STRIPS Strp Use as directed to check blood glucose FOUR TIMES A DAY. 150 strip 11    cyanocobalamin (VITAMIN B-12) 1000 MCG tablet Take 5,000 mcg by mouth once daily.      dapagliflozin propanediol (FARXIGA) 10 mg tablet Take 1 tablet (10 mg total) by mouth every morning. 90 tablet 3    ezetimibe (ZETIA) 10 mg tablet Take 1 tablet (10 mg total) by mouth once daily. 30 tablet 3    insulin aspart, niacinamide, (FIASP FLEXTOUCH U-100 INSULIN SUBQ)       insulin degludec (TRESIBA FLEXTOUCH U-200) 200  "unit/mL (3 mL) insulin pen Inject 60 Units into the skin once daily. (Patient taking differently: Inject 52 Units into the skin once daily.) 9 mL 3    lisinopriL (PRINIVIL,ZESTRIL) 20 MG tablet Take 1 tablet (20 mg total) by mouth once daily. 90 tablet 3    ondansetron (ZOFRAN-ODT) 4 MG TbDL Take 1 tablet (4 mg total) by mouth every 8 (eight) hours as needed (nausea and vomiting). 20 tablet 0    TECHLITE PEN NEEDLE 32 gauge x 5/32" Ndle USE with insulin FOUR TIMES A DAY      [DISCONTINUED] rosuvastatin (CRESTOR) 10 MG tablet Take 10 mg by mouth once daily.      [DISCONTINUED] rosuvastatin (CRESTOR) 20 MG tablet Take 1 tablet (20 mg total) by mouth once daily. 90 tablet 3    [DISCONTINUED] semaglutide (OZEMPIC) 0.25 mg or 0.5 mg (2 mg/3 mL) pen injector Inject 0.25 mg into the skin every 7 days. 3 mL 2   [2]   Social History  Socioeconomic History    Marital status:    Tobacco Use    Smoking status: Former     Current packs/day: 0.00     Average packs/day: 1 pack/day for 38.8 years (38.8 ttl pk-yrs)     Types: Cigarettes, Pipe, Cigars     Start date: 1975     Quit date: 3/1/2014     Years since quittin.0    Smokeless tobacco: Never   Substance and Sexual Activity    Alcohol use: Not Currently     Alcohol/week: 3.0 standard drinks of alcohol     Types: 3 Cans of beer per week    Drug use: Never    Sexual activity: Not Currently     Partners: Female     Birth control/protection: Condom     Social Drivers of Health     Financial Resource Strain: Low Risk  (7/3/2024)    Overall Financial Resource Strain (CARDIA)     Difficulty of Paying Living Expenses: Not hard at all   Food Insecurity: No Food Insecurity (7/3/2024)    Hunger Vital Sign     Worried About Running Out of Food in the Last Year: Never true     Ran Out of Food in the Last Year: Never true   Transportation Needs: No Transportation Needs (7/3/2024)    TRANSPORTATION NEEDS     Transportation : No   Physical Activity: Sufficiently Active " (7/3/2024)    Exercise Vital Sign     Days of Exercise per Week: 7 days     Minutes of Exercise per Session: 30 min   Stress: No Stress Concern Present (7/3/2024)    [employer] [place] [employer] - Occupational Stress Questionnaire     Feeling of Stress : Not at all   Housing Stability: Unknown (7/3/2024)    Housing Stability Vital Sign     Unable to Pay for Housing in the Last Year: No     Homeless in the Last Year: No

## 2025-03-11 NOTE — HPI
Patient is a 75-year-old man with history of hyperlipidemia, hypertension, diabetes mellitus type 2 who had arrived at the emergency room with complaints of nausea vomiting abdominal discomfort.     On Saturday patient had gone to a [employer] Dinner with a group of people that evening patient started to have some issues of right upper quadrant abdominal pain, sharp stabbing, rib pain made worse with breathing with associated symptoms of nausea and multiple episodes of nonbilious none hematemesis emesis.  Patient denies any fevers or chills but has had some persistent intermittent pain in that region.  He is also complained of some mid to lower back pain within the same area as well as multiple episodes of diarrhea.  However, he has not had any issues of diarrhea in the past 24 hours but persistent abdominal discomfort with continued nausea and vomiting.  Patient also has intermittent cough, body aches and some ear pain.     Patient states that nothing makes it worse other than deep breathing.  He has a decreased appetite.  Denies having similar symptoms or problems with indigestion after eating fatty foods in the past.  Patient denies any alcohol use for over 30 years.     Initial vitals in the emergency room 36.8 °C, 112 bpm, 18 respirations a minute, 155/83 mmHg 95% on room air.  Patient weighs 102.1 kg.     Review of patient's laboratory studies reveal patient to have a white blood cell count of 23.3, hemoglobin of 16, MCV of 91, platelet count of 173.  Neutrophil predominance with a left shift of 85.8%.     Patient has a sodium of 133, potassium 4.6, chloride 97, carbon dioxide 19, BUN/creatinine 19/0.88.  Glucose of 176.  Total protein/albumin 6.6/4.1.  T. bili elevated at 1.9.  AST/ALT 36/23 with a normal alkaline phosphatase of 131.     Back in October patient's last hemoglobin A1c was elevated at 9.5.  Influenza A/B and COVID-19 testing were negative.  Urinalysis shows no evidence of UTI but does show evidence  of glucosuria.     CT of the abdomen and pelvis without IV contrast reveals patient to have evidence of acute cholecystitis.     Patient is admitted to observation services for issues of acute cholecystitis.

## 2025-03-11 NOTE — FIRST PROVIDER EVALUATION
Medical screening examination initiated.  I have conducted a focused provider triage encounter, findings are as follows:    Brief history of present illness:      Vitals:    03/11/25 1735 03/11/25 1755 03/11/25 1810 03/11/25 1825   BP: (!) 159/65 (!) 142/61 (!) 92/56 (!) 137/57   BP Location:       Pulse: 89 84 84 82   Resp: 20 18 20 20   Temp: 98.9 °F (37.2 °C) 99 °F (37.2 °C) 99.6 °F (37.6 °C) 99.3 °F (37.4 °C)   TempSrc:  Axillary Axillary Axillary   SpO2: 98% (!) 94% (!) 92% (!) 94%   Weight:       Height:           Pertinent physical exam:      Brief workup plan:      Preliminary workup initiated; this workup will be continued and followed by the physician or advanced practice provider that is assigned to the patient when roomed.

## 2025-03-11 NOTE — ASSESSMENT & PLAN NOTE
Patient's FSGs are uncontrolled due to hyperglycemia on current medication regimen.  Last A1c reviewed-   Lab Results   Component Value Date    HGBA1C 9.5 (H) 10/14/2024     Most recent fingerstick glucose reviewed-   Recent Labs   Lab 03/11/25  0634 03/11/25  1114   POCTGLUCOSE 130* 185*     Current correctional scale  Low  Maintain anti-hyperglycemic dose as follows-   Antihyperglycemics (From admission, onward)      Start     Stop Route Frequency Ordered    03/11/25 0900  insulin glargine U-100 (Lantus) pen 40 Units         -- SubQ Daily 03/11/25 0002    03/11/25 0021  insulin aspart U-100 pen 0-5 Units         -- SubQ Every 6 hours PRN 03/11/25 0002          Hold Oral hypoglycemics while patient is in the hospital.

## 2025-03-11 NOTE — SUBJECTIVE & OBJECTIVE
Interval History:      Review of Systems   Constitutional:  Negative for appetite change, fatigue and fever.   Respiratory:  Negative for cough, shortness of breath and wheezing.    Cardiovascular:  Negative for chest pain and leg swelling.   Gastrointestinal:  Positive for abdominal pain and nausea. Negative for abdominal distention, constipation, diarrhea and vomiting.   Skin:  Negative for color change, pallor, rash and wound.   Neurological:  Negative for tremors, syncope and headaches.   Psychiatric/Behavioral:  Negative for agitation and behavioral problems.      Objective:     Vital Signs (Most Recent):  Temp: 98.5 °F (36.9 °C) (03/11/25 1115)  Pulse: 85 (03/11/25 1115)  Resp: 18 (03/11/25 1115)  BP: (!) 131/59 (DEISY Pascal notified) (03/11/25 1115)  SpO2: 95 % (03/11/25 1115) Vital Signs (24h Range):  Temp:  [98.3 °F (36.8 °C)-99.4 °F (37.4 °C)] 98.5 °F (36.9 °C)  Pulse:  [] 85  Resp:  [16-18] 18  SpO2:  [95 %-100 %] 95 %  BP: (120-155)/(56-83) 131/59     Weight: 100.8 kg (222 lb 3.2 oz)  Body mass index is 33.79 kg/m².    Intake/Output Summary (Last 24 hours) at 3/11/2025 1512  Last data filed at 3/11/2025 1145  Gross per 24 hour   Intake 1987.5 ml   Output 1325 ml   Net 662.5 ml         Physical Exam  Vitals and nursing note reviewed. Exam conducted with a chaperone present.   Constitutional:       General: He is not in acute distress.     Appearance: Normal appearance. He is normal weight. He is not ill-appearing.   HENT:      Head: Normocephalic and atraumatic.      Nose: Nose normal.   Eyes:      General: No scleral icterus.     Conjunctiva/sclera: Conjunctivae normal.   Cardiovascular:      Rate and Rhythm: Normal rate and regular rhythm.      Pulses: Normal pulses.      Heart sounds: Normal heart sounds.   Pulmonary:      Effort: Pulmonary effort is normal.      Breath sounds: Normal breath sounds.   Abdominal:      General: Abdomen is flat. Bowel sounds are normal.      Palpations: Abdomen is  soft.      Tenderness: There is abdominal tenderness (RUQ).   Skin:     General: Skin is warm and dry.      Findings: No erythema or rash.   Neurological:      General: No focal deficit present.      Mental Status: He is alert and oriented to person, place, and time.   Psychiatric:         Mood and Affect: Mood normal.         Behavior: Behavior normal.         Thought Content: Thought content normal.               Significant Labs: All pertinent labs within the past 24 hours have been reviewed.  CBC:   Recent Labs   Lab 03/10/25  1519 03/10/25  1912 03/11/25  0658   WBC 23.31* 20.65* 16.47*   HGB 16.0 16.5 14.5   HCT 47.5 50.2 44.9    145 140     CMP:   Recent Labs   Lab 03/10/25  1519 03/10/25  1912 03/11/25  0658   * 133* 135*   K 4.6 4.6 4.0   CL 97* 96* 103   CO2 19* 22 23   BUN 19 21* 20   CREATININE 0.88 0.98 0.81   CALCIUM 9.3 9.2 8.6   ALBUMIN 4.1 4.1 3.1*   BILITOT 1.9* 1.9* 1.4*   ALKPHOS 131 98 95   AST 36 35 22   ALT 23 26 17       Significant Imaging: I have reviewed all pertinent imaging results/findings within the past 24 hours.

## 2025-03-11 NOTE — NURSING
Pt to floor via stretcher accompanied by ER RN. Oriented to room. Fall precautions in place. Bed in lowest position. Call bell in reach.

## 2025-03-11 NOTE — NURSING
0812 Dr. Ramirez notified of consult.     0830 called and updated Dr. Mason of US findings and let him know Dr. Ramirez was aware of consult. Dr. Mason requested to know what time Dr. Robbins would see pt.    0845 called Dr. Ramirez stated he would see pt at noon; per Dr. Mason request    0853 called and updated Dr. Mason that cardiology would round on pt at noon. Dr. Mason stated the patient needed to be evaluated by cardio earlier and instructed me to consult [facility] if Dr. Ramirez could not see the pt this morning.     0855 Dyan Gutierrez notified on the floor of Dr. Mason's request to have [facility] on board. She stated we would need records from Dr. Ramirez's office faxed over and Dr. Ramirez should be notified now since this pt is established with him already. Called Dr. Ramirez's office for records and to update him of Dr. Mason's requests. Dr. Ramirez stated he was in clinic and could not leave to see this pt on the floor before noon and [facility] could see the patient for pre-op clearance.     0900 Dr. Mason updated by phone of above.     1045 records received from Dr. Burris office and hand delivered to cath lab by this nurse

## 2025-03-11 NOTE — ASSESSMENT & PLAN NOTE
Patient's blood pressure range in the last 24 hours was: BP  Min: 120/60  Max: 155/83.The patient's inpatient anti-hypertensive regimen is listed below:  Current Antihypertensives  lisinopriL tablet 20 mg, Daily, Oral    Plan  - BP is controlled, no changes needed to their regimen  -

## 2025-03-11 NOTE — CONSULTS
[facility]    Cardiology  Consult Note    Patient Name: González Soni  MRN: [id number]  Admission Date: 3/10/2025  Hospital Length of Stay: 1 days  Code Status: Prior   Attending Provider: No att. providers found   Consulting Provider: Toni Bang MD  Primary Care Physician: Naina Spear FNP-C  Principal Problem:Acute cholecystitis    Patient information was obtained from patient and ER records.     Subjective:     Chief Complaint/Reason for Consult:  Preop clearance for cholecystectomy    HPI:  75-year-old pleasant male known to Dr. Ramirez with a history of moderate right ICA stenosis, hypertension, diabetes, obesity, hyperlipidemia admitted to the hospital with right upper quadrant pain.  White blood cell count elevated at 23.31 and  abdomen revealed a 2 cm calcified gallstone with impacted neck of the gallbladder with acute cholecystitis.  He is scheduled for cholecystectomy today and surgeon is requesting preoperative cardiovascular clearance.    EKG is abnormal with inferior Q-waves.  Known.  8/24:  ETT at Dr. Ramirez office revealed no ischemic ST changes and no chest pain with 6 Mets of activity  3/25:  Preserved LVEF with no significant valvular pathology.    Patient is very active at baseline.  Walks 1 mile, 5 times per week.  Plays pickleball twice per week.  Denies any chest pain or shortness of breath      Past Medical History:   Diagnosis Date    Diabetes mellitus, type 2     Essential tremor     Family history of colon cancer     Hypercholesterolemia     Hypertension     Pernicious anemia      Past Surgical History:   Procedure Laterality Date    CATARACT EXTRACTION Right 04/16/2021    CATARACT EXTRACTION Left 04/02/2021    COLONOSCOPY  12/16/2021    EYE SURGERY  8-2021    cataract surgeries    FRACTURE SURGERY      right leg, tibia and fibia    retina rupture repair Left 2018     Review of patient's allergies indicates:  No Known Allergies  No current  "facility-administered medications on file prior to encounter.     Current Outpatient Medications on File Prior to Encounter   Medication Sig    aspirin (ECOTRIN) 81 MG EC tablet Take 1 tablet (81 mg total) by mouth once daily.    CONTOUR NEXT TEST STRIPS Strp Use as directed to check blood glucose FOUR TIMES A DAY.    cyanocobalamin (VITAMIN B-12) 1000 MCG tablet Take 5,000 mcg by mouth once daily.    dapagliflozin propanediol (FARXIGA) 10 mg tablet Take 1 tablet (10 mg total) by mouth every morning.    ezetimibe (ZETIA) 10 mg tablet Take 1 tablet (10 mg total) by mouth once daily.    insulin aspart, niacinamide, (FIASP FLEXTOUCH U-100 INSULIN SUBQ)     insulin degludec (TRESIBA FLEXTOUCH U-200) 200 unit/mL (3 mL) insulin pen Inject 60 Units into the skin once daily. (Patient taking differently: Inject 52 Units into the skin once daily.)    lisinopriL (PRINIVIL,ZESTRIL) 20 MG tablet Take 1 tablet (20 mg total) by mouth once daily.    ondansetron (ZOFRAN-ODT) 4 MG TbDL Take 1 tablet (4 mg total) by mouth every 8 (eight) hours as needed (nausea and vomiting).    TECHLITE PEN NEEDLE 32 gauge x 5/32" Ndle USE with insulin FOUR TIMES A DAY     Family History       Problem Relation (Age of Onset)    Cancer Mother, Father, Maternal Grandfather    Colon cancer Mother    Diabetes Mother    Heart disease Father    Prostate cancer Father          Tobacco Use    Smoking status: Former     Current packs/day: 0.00     Average packs/day: 1 pack/day for 38.8 years (38.8 ttl pk-yrs)     Types: Cigarettes, Pipe, Cigars     Start date: 1975     Quit date: 3/1/2014     Years since quittin.0    Smokeless tobacco: Never   Substance and Sexual Activity    Alcohol use: Not Currently     Alcohol/week: 3.0 standard drinks of alcohol     Types: 3 Cans of beer per week    Drug use: Never    Sexual activity: Not Currently     Partners: Female     Birth control/protection: Condom       Review of Systems   Constitutional: Negative.  " "  HENT: Negative.     Eyes: Negative.    Respiratory: Negative.     Cardiovascular: Negative.    Gastrointestinal:  Positive for abdominal pain and nausea.   Endocrine: Negative.    Genitourinary: Negative.    Musculoskeletal: Negative.    Skin: Negative.    Allergic/Immunologic: Negative.    Neurological: Negative.    Hematological: Negative.    Psychiatric/Behavioral: Negative.       Objective:     Vital Signs (Most Recent):  Temp: 98.5 °F (36.9 °C) (03/11/25 0745)  Pulse: 89 (03/11/25 0745)  Resp: 16 (03/11/25 0745)  BP: 128/63 (03/11/25 0745)  SpO2: 95 % (03/11/25 0745) Vital Signs (24h Range):  Temp:  [97.9 °F (36.6 °C)-99.4 °F (37.4 °C)] 98.5 °F (36.9 °C)  Pulse:  [] 89  Resp:  [16-18] 16  SpO2:  [95 %-100 %] 95 %  BP: (120-155)/(56-83) 128/63   Weight: 100.8 kg (222 lb 3.2 oz)  Body mass index is 33.79 kg/m².  SpO2: 95 %       Intake/Output Summary (Last 24 hours) at 3/11/2025 1113  Last data filed at 3/11/2025 0638  Gross per 24 hour   Intake 1000 ml   Output 750 ml   Net 250 ml     Lines/Drains/Airways       Peripheral Intravenous Line  Duration                  Peripheral IV - Single Lumen 03/10/25 1914 20 G Right Antecubital <1 day                  Significant Labs:   Chemistries:   Recent Labs   Lab 03/10/25  1519 03/10/25  1912 03/11/25  0658   * 133* 135*   K 4.6 4.6 4.0   CL 97* 96* 103   CO2 19* 22 23   BUN 19 21* 20   CREATININE 0.88 0.98 0.81   CALCIUM 9.3 9.2 8.6   BILITOT 1.9* 1.9* 1.4*   ALKPHOS 131 98 95   ALT 23 26 17   AST 36 35 22   GLUCOSE 176* 218* 121*        CBC/Anemia Labs: Coags:    Recent Labs   Lab 03/10/25  1519 03/10/25  1912 03/11/25  0658   WBC 23.31* 20.65* 16.47*   HGB 16.0 16.5 14.5   HCT 47.5 50.2 44.9    145 140   MCV 91.0 91.6 92.6   RDW 13.2 13.2 13.3    No results for input(s): "PT", "INR", "APTT" in the last 168 hours.     Significant Imaging:  Imaging Results              CT Abdomen Pelvis  Without Contrast (Final result)  Result time 03/10/25 " 21:07:02      Final result by Shukri Rocha MD (03/10/25 21:07:02)                   Impression:        1. A 2 cm calcified gallstone is present in the neck of the gallbladder and is associated with thickening of the gallbladder wall and Meghan cholecystic edema which extends into the duodenal bulb and 1st portion of the duodenum and I cannot exclude that there is involvement of the atrophic pancreatic head with some stranding extending from the gallbladder fossa along the inferior aspect of the right lobe of the liver into the right paracolic gutter with some associated colitis.  The pancreas is diffusely atrophic. I suspect that this represents cholelithiasis, impacted in the neck of the gallbladder with acute cholecystitis with some involvement of the posterior lung bases.    n/a    CATEGORY: n/a    The following dose reduction techniques are used for all CT at [facility]:    1.   Automated exposure control.    2.   Adjustment of the mA and/or kV according to patient size.    3.   Use of iterative reconstruction technique.      Electronically signed by: Shukri Rocha  Date:    03/10/2025  Time:    21:07               Narrative:    EXAMINATION:  CT ABDOMEN PELVIS WITHOUT CONTRAST    CLINICAL HISTORY:  ruq pain, xray states possible choley;    TECHNIQUE:  Low dose axial images, sagittal and coronal reformations were obtained from the lung bases to the pubic symphysis.  Oral contrast was not administered.    COMPARISON:  None    FINDINGS:  Bilateral posterior basilar interstitial coarsening is present, more on the right than left.    Liver:  No clinically significant abnormalities noted.    Gallbladder/Biliary System:  A 2 cm calcified gallstone is present in the neck of the gallbladder and is associated with thickening of the gallbladder wall and Meghan cholecystic edema which extends into the duodenal bulb and 1st portion of the duodenum and I suspect that there is involvement of the  atrophic pancreatic head with some stranding extending from the gallbladder fossa along the inferior aspect of the right lobe of the liver into the right paracolic gutter.  The pancreas is diffusely atrophic.  I suspect that this represents cholelithiasis with acute cholecystitis.    Spleen:  No clinically significant abnormalities noted.    Adrenal glands:  No clinically significant abnormalities noted.    Pancreas:  The pancreas shows diffuse extensive atrophy.    Kidneys/Urinary Tract:  Both kidneys demonstrate mild Meghan renal stranding.    Urinary bladder:  No clinically significant abnormalities noted.    Prostate gland/uterus and ovaries:  No clinically significant abnormalities noted.    GI tract:  There is indistinctness and stranding involving the duodenal bulb and 1st portion of the duodenum which is associated with the pathology involving the gallbladder.  There is stranding involving the right paracolic gutter which appears to be direct extension from the gallbladder pathology.  The appendix is not identified.    Vascular structures:   Mild to moderate atherosclerotic calcification is present involving the aorta.    Musculoskeletal structures:  Mild to moderate bony demineralization is present with moderate degenerative findings noted throughout the spine and to a lesser extent the SI joints and the hip joints.    Miscellaneous: The inguinal canals are filled with fat compatible with potential inguinal hernias, more evident on the right.                                       X-Ray Ribs 2 View Right (Final result)  Result time 03/10/25 20:03:14      Final result by Shukri Rocha MD (03/10/25 20:03:14)                   Impression:        1.  FOCAL WIDENING OF THE POSTEROLATERAL ASPECT OF THE RIGHT 9TH RIB WHICH I SUSPECT REPRESENTS AN OLD HEALED FRACTURE DEFORMITY.    2.   NO ACUTE DISPLACED FRACTURES OR OTHER SIGNIFICANT ABNORMALITIES NOTED.    3.  I SUSPECT CHOLELITHIASIS.      Electronically signed  by: Shukri Rocha  Date:    03/10/2025  Time:    20:03               Narrative:    EXAMINATION:  XR RIBS 2 VIEW RIGHT    CLINICAL HISTORY:  Pleurodynia    TECHNIQUE:  Two views of the right ribs were performed.    COMPARISON:  None    FINDINGS:  There is of focal widening of the posterolateral aspect of the right 9th rib which I suspect represents an old healed fracture.  I see no definite acute displaced rib fractures or complications of rib fractures such as a hemothorax, pneumothorax or subcutaneous emphysema.                                        Physical Exam  Constitutional:       General: He is in acute distress.      Appearance: Normal appearance.   HENT:      Right Ear: Tympanic membrane normal.      Left Ear: Tympanic membrane normal.   Eyes:      Pupils: Pupils are equal, round, and reactive to light.   Cardiovascular:      Rate and Rhythm: Normal rate and regular rhythm.      Pulses: Normal pulses.      Heart sounds: Normal heart sounds.   Pulmonary:      Effort: Pulmonary effort is normal.      Breath sounds: Normal breath sounds.   Abdominal:      General: Abdomen is flat.      Tenderness: There is abdominal tenderness.   Musculoskeletal:         General: Normal range of motion.      Cervical back: Normal range of motion and neck supple.   Skin:     General: Skin is warm and dry.      Capillary Refill: Capillary refill takes less than 2 seconds.   Neurological:      General: No focal deficit present.      Mental Status: He is alert and oriented to person, place, and time. Mental status is at baseline.       Home Medications:   Medications Ordered Prior to Encounter[1]  Current Schedule Inpatient Medications:   aspirin  81 mg Oral Daily    cyanocobalamin  5,000 mcg Oral Daily    insulin glargine U-100  40 Units Subcutaneous Daily    lisinopriL  20 mg Oral Daily    metroNIDAZOLE IV (PEDS and ADULTS)  500 mg Intravenous Q8H    piperacillin-tazobactam (Zosyn) IV (PEDS and ADULTS) (extended infusion is  not appropriate)  4.5 g Intravenous Q8H     Continuous Infusions:   0.9% NaCl   Intravenous Continuous 125 mL/hr at 03/11/25 0540 New Bag at 03/11/25 0540     Assessment:   Preop cardiovascular risk assessment for cholecystectomy.  MACE greater than 1%.  Elevated but acceptable risk.  METS greater than 4.    Preserved LV systolic function with no significant valvular pathology 3/25  Normal ETT 8/24  Hyperlipidemia  Diabetes  Moderate carotid artery stenosis  Elevated BMI  Essential hypertension    Plan:   Okay to proceed with cholecystectomy at elevated but acceptable risk.  Dr. Ramirez to follow up the patient in the postoperative setting    Discussed with nursing at bedside and Dr. Jose Bang MD  Cardiology         [1]   No current facility-administered medications on file prior to encounter.     Current Outpatient Medications on File Prior to Encounter   Medication Sig Dispense Refill    aspirin (ECOTRIN) 81 MG EC tablet Take 1 tablet (81 mg total) by mouth once daily.      CONTOUR NEXT TEST STRIPS Strp Use as directed to check blood glucose FOUR TIMES A DAY. 150 strip 11    cyanocobalamin (VITAMIN B-12) 1000 MCG tablet Take 5,000 mcg by mouth once daily.      dapagliflozin propanediol (FARXIGA) 10 mg tablet Take 1 tablet (10 mg total) by mouth every morning. 90 tablet 3    ezetimibe (ZETIA) 10 mg tablet Take 1 tablet (10 mg total) by mouth once daily. 30 tablet 3    insulin aspart, niacinamide, (FIASP FLEXTOUCH U-100 INSULIN SUBQ)       insulin degludec (TRESIBA FLEXTOUCH U-200) 200 unit/mL (3 mL) insulin pen Inject 60 Units into the skin once daily. (Patient taking differently: Inject 52 Units into the skin once daily.) 9 mL 3    lisinopriL (PRINIVIL,ZESTRIL) 20 MG tablet Take 1 tablet (20 mg total) by mouth once daily. 90 tablet 3    ondansetron (ZOFRAN-ODT) 4 MG TbDL Take 1 tablet (4 mg total) by mouth every 8 (eight) hours as needed (nausea and vomiting). 20 tablet 0    TECHLITE PEN NEEDLE 32  "gauge x 5/32" Ndle USE with insulin FOUR TIMES A DAY       "

## 2025-03-11 NOTE — PLAN OF CARE
Problem: Adult Inpatient Plan of Care  Goal: Plan of Care Review  3/11/2025 0334 by Annabelle Richardson RN  Outcome: Progressing  3/11/2025 0333 by Annabelle Richardson RN  Outcome: Progressing  Goal: Patient-Specific Goal (Individualized)  3/11/2025 0334 by Annabelle Richardson RN  Outcome: Progressing  3/11/2025 0333 by Annabelle Richardson RN  Outcome: Progressing  Goal: Absence of Hospital-Acquired Illness or Injury  3/11/2025 0334 by Annabelle Richardson RN  Outcome: Progressing  3/11/2025 0333 by Annabelle Richardson RN  Outcome: Progressing  Goal: Optimal Comfort and Wellbeing  3/11/2025 0334 by Annabelle Richardson RN  Outcome: Progressing  3/11/2025 0333 by Annabelle Richardson RN  Outcome: Progressing  Goal: Readiness for Transition of Care  3/11/2025 0334 by Annabelle Richardson RN  Outcome: Progressing  3/11/2025 0333 by Annabelle Richardson RN  Outcome: Progressing     Problem: Diabetes Comorbidity  Goal: Blood Glucose Level Within Targeted Range  3/11/2025 0334 by Annabelle Richardson RN  Outcome: Progressing  3/11/2025 0333 by Annabelle Richardson RN  Outcome: Progressing     Problem: Fall Injury Risk  Goal: Absence of Fall and Fall-Related Injury  Outcome: Progressing     Problem: Infection  Goal: Absence of Infection Signs and Symptoms  Outcome: Progressing

## 2025-03-11 NOTE — PROGRESS NOTES
[facility] Medicine  Progress Note    Patient Name: González Soni  MRN: [id number]  Patient Class: IP- Inpatient   Admission Date: 3/10/2025  Length of Stay: 1 days  Attending Physician: No att. providers found  Primary Care Provider: Naina Spear FNP-C        Subjective     Principal Problem:Acute cholecystitis        HPI:  Patient is a 75-year-old man with history of hyperlipidemia, hypertension, diabetes mellitus type 2 who had arrived at the emergency room with complaints of nausea vomiting abdominal discomfort.     On Saturday patient had gone to a [employer] with a group of people that evening patient started to have some issues of right upper quadrant abdominal pain, sharp stabbing, rib pain made worse with breathing with associated symptoms of nausea and multiple episodes of nonbilious none hematemesis emesis.  Patient denies any fevers or chills but has had some persistent intermittent pain in that region.  He is also complained of some mid to lower back pain within the same area as well as multiple episodes of diarrhea.  However, he has not had any issues of diarrhea in the past 24 hours but persistent abdominal discomfort with continued nausea and vomiting.  Patient also has intermittent cough, body aches and some ear pain.     Patient states that nothing makes it worse other than deep breathing.  He has a decreased appetite.  Denies having similar symptoms or problems with indigestion after eating fatty foods in the past.  Patient denies any alcohol use for over 30 years.     Initial vitals in the emergency room 36.8 °C, 112 bpm, 18 respirations a minute, 155/83 mmHg 95% on room air.  Patient weighs 102.1 kg.     Review of patient's laboratory studies reveal patient to have a white blood cell count of 23.3, hemoglobin of 16, MCV of 91, platelet count of 173.  Neutrophil predominance with a left shift of 85.8%.     Patient has a sodium of 133, potassium 4.6,  chloride 97, carbon dioxide 19, BUN/creatinine 19/0.88.  Glucose of 176.  Total protein/albumin 6.6/4.1.  T. bili elevated at 1.9.  AST/ALT 36/23 with a normal alkaline phosphatase of 131.     Back in October patient's last hemoglobin A1c was elevated at 9.5.  Influenza A/B and COVID-19 testing were negative.  Urinalysis shows no evidence of UTI but does show evidence of glucosuria.     CT of the abdomen and pelvis without IV contrast reveals patient to have evidence of acute cholecystitis.     Patient is admitted to observation services for issues of acute cholecystitis.    Overview/Hospital Course:  03/11/2025 Pt admitted for RUQ pain thought to be cholecysitis, Surgery consulted and cardiac clearance has been ordered and pending this am.  C/o persistent RUQ pain.    Interval History:      Review of Systems   Constitutional:  Negative for appetite change, fatigue and fever.   Respiratory:  Negative for cough, shortness of breath and wheezing.    Cardiovascular:  Negative for chest pain and leg swelling.   Gastrointestinal:  Positive for abdominal pain and nausea. Negative for abdominal distention, constipation, diarrhea and vomiting.   Skin:  Negative for color change, pallor, rash and wound.   Neurological:  Negative for tremors, syncope and headaches.   Psychiatric/Behavioral:  Negative for agitation and behavioral problems.      Objective:     Vital Signs (Most Recent):  Temp: 98.5 °F (36.9 °C) (03/11/25 1115)  Pulse: 85 (03/11/25 1115)  Resp: 18 (03/11/25 1115)  BP: (!) 131/59 (DEISY Pascal notified) (03/11/25 1115)  SpO2: 95 % (03/11/25 1115) Vital Signs (24h Range):  Temp:  [98.3 °F (36.8 °C)-99.4 °F (37.4 °C)] 98.5 °F (36.9 °C)  Pulse:  [] 85  Resp:  [16-18] 18  SpO2:  [95 %-100 %] 95 %  BP: (120-155)/(56-83) 131/59     Weight: 100.8 kg (222 lb 3.2 oz)  Body mass index is 33.79 kg/m².    Intake/Output Summary (Last 24 hours) at 3/11/2025 1512  Last data filed at 3/11/2025 1145  Gross per 24 hour    Intake 1987.5 ml   Output 1325 ml   Net 662.5 ml         Physical Exam  Vitals and nursing note reviewed. Exam conducted with a chaperone present.   Constitutional:       General: He is not in acute distress.     Appearance: Normal appearance. He is normal weight. He is not ill-appearing.   HENT:      Head: Normocephalic and atraumatic.      Nose: Nose normal.   Eyes:      General: No scleral icterus.     Conjunctiva/sclera: Conjunctivae normal.   Cardiovascular:      Rate and Rhythm: Normal rate and regular rhythm.      Pulses: Normal pulses.      Heart sounds: Normal heart sounds.   Pulmonary:      Effort: Pulmonary effort is normal.      Breath sounds: Normal breath sounds.   Abdominal:      General: Abdomen is flat. Bowel sounds are normal.      Palpations: Abdomen is soft.      Tenderness: There is abdominal tenderness (RUQ).   Skin:     General: Skin is warm and dry.      Findings: No erythema or rash.   Neurological:      General: No focal deficit present.      Mental Status: He is alert and oriented to person, place, and time.   Psychiatric:         Mood and Affect: Mood normal.         Behavior: Behavior normal.         Thought Content: Thought content normal.               Significant Labs: All pertinent labs within the past 24 hours have been reviewed.  CBC:   Recent Labs   Lab 03/10/25  1519 03/10/25  1912 03/11/25  0658   WBC 23.31* 20.65* 16.47*   HGB 16.0 16.5 14.5   HCT 47.5 50.2 44.9    145 140     CMP:   Recent Labs   Lab 03/10/25  1519 03/10/25  1912 03/11/25  0658   * 133* 135*   K 4.6 4.6 4.0   CL 97* 96* 103   CO2 19* 22 23   BUN 19 21* 20   CREATININE 0.88 0.98 0.81   CALCIUM 9.3 9.2 8.6   ALBUMIN 4.1 4.1 3.1*   BILITOT 1.9* 1.9* 1.4*   ALKPHOS 131 98 95   AST 36 35 22   ALT 23 26 17       Significant Imaging: I have reviewed all pertinent imaging results/findings within the past 24 hours.      Assessment & Plan  Acute cholecystitis  Surgery following plans for surgery  Cardiac  "clearance    Type 2 diabetes mellitus without complication, with long-term current use of insulin  Patient's FSGs are uncontrolled due to hyperglycemia on current medication regimen.  Last A1c reviewed-   Lab Results   Component Value Date    HGBA1C 9.5 (H) 10/14/2024     Most recent fingerstick glucose reviewed-   Recent Labs   Lab 03/11/25  0634 03/11/25  1114   POCTGLUCOSE 130* 185*     Current correctional scale  Low  Maintain anti-hyperglycemic dose as follows-   Antihyperglycemics (From admission, onward)      Start     Stop Route Frequency Ordered    03/11/25 0900  insulin glargine U-100 (Lantus) pen 40 Units         -- SubQ Daily 03/11/25 0002    03/11/25 0021  insulin aspart U-100 pen 0-5 Units         -- SubQ Every 6 hours PRN 03/11/25 0002          Hold Oral hypoglycemics while patient is in the hospital.      Hypercholesterolemia   Patient is chronically on statin.will continue for now. Monitor clinically. Last LDL was No results found for: "LDLCALC"       Hypertension  Patient's blood pressure range in the last 24 hours was: BP  Min: 120/60  Max: 155/83.The patient's inpatient anti-hypertensive regimen is listed below:  Current Antihypertensives  lisinopriL tablet 20 mg, Daily, Oral    Plan  - BP is controlled, no changes needed to their regimen  -    Class 2 severe obesity due to excess calories with serious comorbidity and body mass index (BMI) of 35.0 to 35.9 in adult  Body mass index is 33.79 kg/m². Morbid obesity complicates all aspects of disease management from diagnostic modalities to treatment. Weight loss encouraged and health benefits explained to patient.       Rib pain on right side  Likele due to cholecystitis    Abdominal pain  RUQ    Right upper quadrant abdominal pain  Choelcystitis  Continue iv abx  Surgery consulted    VTE Risk Mitigation (From admission, onward)      None            Discharge Planning   JANEL: 3/12/2025     Code Status: Prior   Medical Readiness for Discharge Date: "   Discharge Plan A: Home                        Tali Garcia MD  Department of Hospital Medicine   [facility]/Surg     Statement Selected

## 2025-03-11 NOTE — ANESTHESIA POSTPROCEDURE EVALUATION
Anesthesia Post Evaluation    Patient: González Soni    Procedure(s) Performed: Procedure(s) (LRB):  CHOLECYSTECTOMY, LAPAROSCOPIC (N/A)    Final Anesthesia Type: general      Patient location during evaluation: PACU  Patient participation: Yes- Able to Participate  Level of consciousness: awake and alert, awake and oriented  Post-procedure vital signs: reviewed and stable  Pain management: adequate  Airway patency: patent    PONV status at discharge: No PONV  Anesthetic complications: no      Cardiovascular status: blood pressure returned to baseline  Respiratory status: unassisted, room air and spontaneous ventilation  Hydration status: euvolemic  Follow-up not needed.              Vitals Value Taken Time   /61 03/11/25 17:23   Temp 36.4 °C (97.6 °F) 03/11/25 17:20   Pulse 82 03/11/25 17:23   Resp 20 03/11/25 17:20   SpO2 98 % 03/11/25 17:23   Vitals shown include unfiled device data.      No case tracking events are documented in the log.      Pain/Tanika Score: Tanika Score: 8 (3/11/2025  5:19 PM)

## 2025-03-11 NOTE — PROGRESS NOTES
Pharmacist Renal Dose Adjustment Note    González Soni is a 75 y.o. male being treated with the medication zosyn    Patient Data:    Vital Signs (Most Recent):  Temp: 98.6 °F (37 °C) (03/10/25 2329)  Pulse: 99 (03/10/25 2323)  Resp: 18 (03/10/25 2056)  BP: (!) 135/56 (03/10/25 2323)  SpO2: 95 % (03/10/25 2323) Vital Signs (72h Range):  Temp:  [97.9 °F (36.6 °C)-99 °F (37.2 °C)]   Pulse:  []   Resp:  [18]   BP: (120-155)/(56-83)   SpO2:  [95 %-100 %]      Recent Labs   Lab 03/10/25  1519 03/10/25  1912   CREATININE 0.88 0.98     Serum creatinine: 0.98 mg/dL 03/10/25 1912  Estimated creatinine clearance: 76.6 mL/min    Medication:zosyn dose: 3.375g frequency q6h will be changed to medication:zosyn dose:4.5g frequency:q8h    Pharmacist's Name: Tali Dunaway  Pharmacist's Extension:

## 2025-03-11 NOTE — ANESTHESIA PREPROCEDURE EVALUATION
03/11/2025  González Soni is a 75 y.o., male presents for lap amandeep        Anesthesia History     Diabetes mellitus, type 2 Essential tremor   Family history of colon cancer Hypertension   Hypercholesterolemia Pernicious anemia     Surgical History     COLONOSCOPY CATARACT EXTRACTION   CATARACT EXTRACTION retina rupture repair   EYE SURGERY FRACTURE SURGERY         Pre-op Assessment    I have reviewed the Patient Summary Reports.     I have reviewed the Nursing Notes. I have reviewed the NPO Status.   I have reviewed the Medications.     Review of Systems  Anesthesia Hx:  No problems with previous Anesthesia             Denies Family Hx of Anesthesia complications.    Denies Personal Hx of Anesthesia complications.                    Social:  Former Smoker       Hematology/Oncology:  Hematology Normal   Oncology Normal                                   EENT/Dental:  EENT/Dental Normal           Cardiovascular:     Hypertension                                    Hypertension         Pulmonary:        Sleep Apnea                Renal/:  Renal/ Normal                 Hepatic/GI:  Hepatic/GI Normal                    Musculoskeletal:  Musculoskeletal Normal                Neurological:  Neurology Normal                                      Endocrine:  Diabetes, using insulin    Diabetes                    Obesity / BMI > 30  Dermatological:  Skin Normal    Psych:  Psychiatric Normal                    Physical Exam  General: Well nourished, Cooperative, Alert and Oriented    Airway:  Mallampati: II / II  Mouth Opening: Normal  TM Distance: Normal  Tongue: Normal  Neck ROM: Normal ROM    Dental:  Intact        Anesthesia Plan  Type of Anesthesia, risks & benefits discussed:    Anesthesia Type: Gen ETT  Intra-op Monitoring Plan: Standard ASA Monitors  Post Op Pain Control Plan: multimodal  analgesia  Induction:  IV  Airway Plan: Direct, Post-Induction  Informed Consent: Informed consent signed with the Patient and all parties understand the risks and agree with anesthesia plan.  All questions answered. Patient consented to blood products? Yes  ASA Score: 3  Day of Surgery Review of History & Physical: H&P Update referred to the surgeon/provider.I have interviewed and examined the patient. I have reviewed the patient's H&P dated: There are no significant changes.     Ready For Surgery From Anesthesia Perspective.     .

## 2025-03-12 LAB
ALBUMIN SERPL-MCNC: 2.7 G/DL (ref 3.4–5)
ALBUMIN/GLOB SERPL: 1 RATIO
ALP SERPL-CCNC: 114 UNIT/L (ref 50–144)
ALT SERPL-CCNC: 54 UNIT/L (ref 1–45)
ANION GAP SERPL CALC-SCNC: 12 MEQ/L (ref 2–13)
ANION GAP SERPL CALC-SCNC: 13 MEQ/L (ref 2–13)
AST SERPL-CCNC: 75 UNIT/L (ref 17–59)
BASOPHILS # BLD AUTO: 0.04 X10(3)/MCL (ref 0.01–0.08)
BASOPHILS NFR BLD AUTO: 0.4 % (ref 0.1–1.2)
BILIRUB SERPL-MCNC: 1.2 MG/DL (ref 0–1)
BUN SERPL-MCNC: 27 MG/DL (ref 7–20)
BUN SERPL-MCNC: 28 MG/DL (ref 7–20)
CALCIUM SERPL-MCNC: 7.9 MG/DL (ref 8.4–10.2)
CALCIUM SERPL-MCNC: 7.9 MG/DL (ref 8.4–10.2)
CHLORIDE SERPL-SCNC: 104 MMOL/L (ref 98–110)
CHLORIDE SERPL-SCNC: 104 MMOL/L (ref 98–110)
CO2 SERPL-SCNC: 15 MMOL/L (ref 21–32)
CO2 SERPL-SCNC: 17 MMOL/L (ref 21–32)
CREAT SERPL-MCNC: 0.88 MG/DL (ref 0.66–1.25)
CREAT SERPL-MCNC: 1.04 MG/DL (ref 0.66–1.25)
CREAT/UREA NIT SERPL: 27 (ref 12–20)
CREAT/UREA NIT SERPL: 31 (ref 12–20)
EOSINOPHIL # BLD AUTO: 0.03 X10(3)/MCL (ref 0.04–0.54)
EOSINOPHIL NFR BLD AUTO: 0.3 % (ref 0.7–7)
ERYTHROCYTE [DISTWIDTH] IN BLOOD BY AUTOMATED COUNT: 13.7 %
EST. AVERAGE GLUCOSE BLD GHB EST-MCNC: 200.1 MG/DL (ref 70–115)
GFR SERPLBLD CREATININE-BSD FMLA CKD-EPI: 75 ML/MIN/1.73/M2
GFR SERPLBLD CREATININE-BSD FMLA CKD-EPI: 90 ML/MIN/1.73/M2
GLOBULIN SER-MCNC: 2.6 GM/DL (ref 2–3.9)
GLUCOSE SERPL-MCNC: 222 MG/DL (ref 70–115)
GLUCOSE SERPL-MCNC: 297 MG/DL (ref 70–115)
GRAM STN SPEC: NORMAL
HBA1C MFR BLD: 8.6 % (ref 4–6)
HCT VFR BLD AUTO: 40 % (ref 36–52)
HGB BLD-MCNC: 13.4 G/DL (ref 13–18)
IMM GRANULOCYTES # BLD AUTO: 0.06 X10(3)/MCL (ref 0–0.03)
IMM GRANULOCYTES NFR BLD AUTO: 0.6 % (ref 0–0.5)
LYMPHOCYTES # BLD AUTO: 1.12 X10(3)/MCL (ref 1.32–3.57)
LYMPHOCYTES NFR BLD AUTO: 10.9 % (ref 20–55)
MCH RBC QN AUTO: 31 PG (ref 27–34)
MCHC RBC AUTO-ENTMCNC: 33.5 G/DL (ref 31–37)
MCV RBC AUTO: 92.6 FL (ref 79–99)
MONOCYTES # BLD AUTO: 0.79 X10(3)/MCL (ref 0.3–0.82)
MONOCYTES NFR BLD AUTO: 7.7 % (ref 4.7–12.5)
NEUTROPHILS # BLD AUTO: 8.27 X10(3)/MCL (ref 1.78–5.38)
NEUTROPHILS NFR BLD AUTO: 80.1 % (ref 37–73)
NRBC BLD AUTO-RTO: 0 %
PLATELET # BLD AUTO: 143 X10(3)/MCL (ref 140–371)
PMV BLD AUTO: 11.8 FL (ref 9.4–12.4)
POCT GLUCOSE: 196 MG/DL (ref 70–110)
POCT GLUCOSE: 227 MG/DL (ref 70–110)
POCT GLUCOSE: 239 MG/DL (ref 70–110)
POCT GLUCOSE: 259 MG/DL (ref 70–110)
POTASSIUM SERPL-SCNC: 4 MMOL/L (ref 3.5–5.1)
POTASSIUM SERPL-SCNC: 4.3 MMOL/L (ref 3.5–5.1)
PROT SERPL-MCNC: 5.3 GM/DL (ref 6.3–8.2)
RBC # BLD AUTO: 4.32 X10(6)/MCL (ref 4–6)
SODIUM SERPL-SCNC: 131 MMOL/L (ref 136–145)
SODIUM SERPL-SCNC: 134 MMOL/L (ref 136–145)
WBC # BLD AUTO: 10.31 X10(3)/MCL (ref 4–11.5)

## 2025-03-12 PROCEDURE — 83036 HEMOGLOBIN GLYCOSYLATED A1C: CPT | Performed by: SURGERY

## 2025-03-12 PROCEDURE — 85025 COMPLETE CBC W/AUTO DIFF WBC: CPT | Performed by: SURGERY

## 2025-03-12 PROCEDURE — 25000003 PHARM REV CODE 250: Performed by: SURGERY

## 2025-03-12 PROCEDURE — 63600175 PHARM REV CODE 636 W HCPCS: Performed by: SURGERY

## 2025-03-12 PROCEDURE — 80048 BASIC METABOLIC PNL TOTAL CA: CPT | Performed by: SURGERY

## 2025-03-12 PROCEDURE — 11000001 HC ACUTE MED/SURG PRIVATE ROOM

## 2025-03-12 PROCEDURE — 36415 COLL VENOUS BLD VENIPUNCTURE: CPT | Performed by: SURGERY

## 2025-03-12 PROCEDURE — 80053 COMPREHEN METABOLIC PANEL: CPT | Performed by: SURGERY

## 2025-03-12 RX ADMIN — METRONIDAZOLE 500 MG: 5 INJECTION, SOLUTION INTRAVENOUS at 08:03

## 2025-03-12 RX ADMIN — INSULIN ASPART 2 UNITS: 100 INJECTION, SOLUTION INTRAVENOUS; SUBCUTANEOUS at 07:03

## 2025-03-12 RX ADMIN — ASPIRIN 81 MG: 81 TABLET ORAL at 09:03

## 2025-03-12 RX ADMIN — METRONIDAZOLE 500 MG: 5 INJECTION, SOLUTION INTRAVENOUS at 05:03

## 2025-03-12 RX ADMIN — PIPERACILLIN AND TAZOBACTAM 4.5 G: 4; .5 INJECTION, POWDER, FOR SOLUTION INTRAVENOUS; PARENTERAL at 09:03

## 2025-03-12 RX ADMIN — INSULIN GLARGINE 40 UNITS: 100 INJECTION, SOLUTION SUBCUTANEOUS at 09:03

## 2025-03-12 RX ADMIN — INSULIN ASPART 1 UNITS: 100 INJECTION, SOLUTION INTRAVENOUS; SUBCUTANEOUS at 08:03

## 2025-03-12 RX ADMIN — METRONIDAZOLE 500 MG: 5 INJECTION, SOLUTION INTRAVENOUS at 03:03

## 2025-03-12 RX ADMIN — PIPERACILLIN AND TAZOBACTAM 4.5 G: 4; .5 INJECTION, POWDER, FOR SOLUTION INTRAVENOUS; PARENTERAL at 06:03

## 2025-03-12 RX ADMIN — PIPERACILLIN AND TAZOBACTAM 4.5 G: 4; .5 INJECTION, POWDER, FOR SOLUTION INTRAVENOUS; PARENTERAL at 03:03

## 2025-03-12 RX ADMIN — INSULIN ASPART 4 UNITS: 100 INJECTION, SOLUTION INTRAVENOUS; SUBCUTANEOUS at 11:03

## 2025-03-12 RX ADMIN — LISINOPRIL 20 MG: 10 TABLET ORAL at 09:03

## 2025-03-12 RX ADMIN — CYANOCOBALAMIN TAB 1000 MCG 5000 MCG: 1000 TAB at 09:03

## 2025-03-12 NOTE — OP NOTE
Procedure date:  03/11/2025      Indications:  75-year-old white male complaining of abdominal pain discomfort with associated gallstones.  Findings consistent with acute gangrenous on chronic cholecystitis related to cholelithiasis to undergo urgent cholecystectomy.     Preop diagnosis:  Acute on chronic gangrenous cholecystitis  Postoperative diagnosis:  Acute on chronic gangrenous cholecystitis secondary to cholelithiasis    Procedure performed:  Laparoscopic cholecystectomy and intra-abdominal drain placement     Procedure in detail:  Patient brought to the operative table in a supine position general endotracheal intubation anesthesia was provided.  Preoperative antibiotics administered.  There the abdomen was sterilely prepped and draped in normal surgical fashion using chlorhexidine.  1% lidocaine with epinephrine used to infiltrate the subcutaneous tissues overlying the supraumbilical region.  A 15 blade was then used to incise the skin with dissection down to underlying fatty tissues.  A Veress needle technique was used into the abdomen on the 1st pass and insufflated to 15 mmHg pressure using CO2 gas.  A 5 mm Visiport trocar was then introduced to the same region.  This was performed using direct visualization without injury to the internal viscera.  Secondary dissection trocars were then placed 1 in the subxiphoid 2 in the right hypogastrium all 5 mm in size.  The gallbladder was identified, grasped and then elevated from the dome.  The entirety of the gallbladder wall was necrotic. The infundibulum was brought down the right lower quadrant.  Both the cystic duct and artery were then meticulously dissected creating the critical view of only the cystic duct and artery entering clearly into the gallbladder with the liver in the background.  Both the duct and the artery were then doubly clipped adjacent to the gallbladder wall and transected.  Hook cautery was used to dissect the gallbladder free of the  liver bed.  There was a mild amount of purulence encountered at the time of dissection.  There was appropriate hemostasis and no bile leakage noted.  The gallbladder was then placed within an Endo-Catch bag and retrieved from the subxiphoid port site.  Upon retrieval of the gallbladder from the subxiphoid port site was closed in interrupted fashion using 0 Vicryl.  A flat 7 Greek GABO drain was placed within the gallbladder fossa for extrusion purulence encountered at the time of surgery and brought out through the right port site.  It was sutured to the skin using a 2-0 nylon.   The skin and subcutaneous tissues were then closed in interrupted fashion using 4 Monocryl subcuticular fashion over the remaining trocar sites.  Sterile bandages were then placed over the wound.  The patient was then relieved of anesthesia stable condition and transferred to postanesthesia care unit.     Complications:  None  Estimated blood loss:  10cc  Specimen   Gallbladder  Intra-abdominal fluid for culture    Disposition:  Upon recovery from anesthesia patient will be transferred to the floor for monitoring and continuous IV antibiotic therapy.        Gale Mason MD

## 2025-03-12 NOTE — ASSESSMENT & PLAN NOTE
Patient's blood pressure range in the last 24 hours was: BP  Min: 92/56  Max: 161/69.The patient's inpatient anti-hypertensive regimen is listed below:  Current Antihypertensives  lisinopriL tablet 20 mg, Daily, Oral    Plan  - BP is controlled, no changes needed to their regimen  -

## 2025-03-12 NOTE — ASSESSMENT & PLAN NOTE
Patient's FSGs are uncontrolled due to hyperglycemia on current medication regimen.  Last A1c reviewed-   Lab Results   Component Value Date    HGBA1C 8.6 (H) 03/12/2025     Most recent fingerstick glucose reviewed-   Recent Labs   Lab 03/11/25  1827 03/11/25  2117 03/12/25  0746   POCTGLUCOSE 176* 232* 227*     Current correctional scale  Low  Maintain anti-hyperglycemic dose as follows-   Antihyperglycemics (From admission, onward)      Start     Stop Route Frequency Ordered    03/11/25 0900  insulin glargine U-100 (Lantus) pen 40 Units         -- SubQ Daily 03/11/25 0002    03/11/25 0021  insulin aspart U-100 pen 0-5 Units         -- SubQ Every 6 hours PRN 03/11/25 0002          Hold Oral hypoglycemics while patient is in the hospital.

## 2025-03-12 NOTE — PLAN OF CARE
Problem: Adult Inpatient Plan of Care  Goal: Plan of Care Review  Outcome: Progressing  Goal: Patient-Specific Goal (Individualized)  Outcome: Progressing  Goal: Absence of Hospital-Acquired Illness or Injury  Outcome: Progressing  Goal: Optimal Comfort and Wellbeing  Outcome: Progressing  Goal: Readiness for Transition of Care  Outcome: Progressing     Problem: Diabetes Comorbidity  Goal: Blood Glucose Level Within Targeted Range  Outcome: Progressing     Problem: Fall Injury Risk  Goal: Absence of Fall and Fall-Related Injury  Outcome: Progressing     Problem: Infection  Goal: Absence of Infection Signs and Symptoms  Outcome: Progressing     Problem: Wound  Goal: Optimal Coping  Outcome: Progressing  Goal: Optimal Functional Ability  Outcome: Progressing  Goal: Absence of Infection Signs and Symptoms  Outcome: Progressing  Goal: Improved Oral Intake  Outcome: Progressing  Goal: Optimal Pain Control and Function  Outcome: Progressing  Goal: Skin Health and Integrity  Outcome: Progressing  Goal: Optimal Wound Healing  Outcome: Progressing     Problem: Cholecystectomy  Goal: Absence of Bleeding  Outcome: Progressing  Goal: Effective Bowel Elimination  Outcome: Progressing  Goal: Fluid and Electrolyte Balance  Outcome: Progressing  Goal: Absence of Infection Signs and Symptoms  Outcome: Progressing  Goal: Anesthesia/Sedation Recovery  Outcome: Progressing  Goal: Pain Control and Function  Outcome: Progressing  Goal: Nausea and Vomiting Relief  Outcome: Progressing  Goal: Effective Urinary Elimination  Outcome: Progressing  Goal: Effective Oxygenation and Ventilation  Outcome: Progressing

## 2025-03-12 NOTE — SUBJECTIVE & OBJECTIVE
Interval History:      Review of Systems   Constitutional:  Negative for appetite change, fatigue and fever.   Respiratory:  Negative for cough, shortness of breath and wheezing.    Cardiovascular:  Negative for chest pain and leg swelling.   Gastrointestinal:  Positive for abdominal pain and nausea. Negative for abdominal distention, constipation, diarrhea and vomiting.   Skin:  Negative for color change, pallor, rash and wound.   Neurological:  Negative for tremors, syncope and headaches.   Psychiatric/Behavioral:  Negative for agitation and behavioral problems.      Objective:     Vital Signs (Most Recent):  Temp: 98.8 °F (37.1 °C) (03/12/25 1111)  Pulse: 75 (03/12/25 1111)  Resp: 18 (03/12/25 1111)  BP: (!) 128/58 (03/12/25 1111)  SpO2: 97 % (03/12/25 1111) Vital Signs (24h Range):  Temp:  [97.2 °F (36.2 °C)-100.1 °F (37.8 °C)] 98.8 °F (37.1 °C)  Pulse:  [60-92] 75  Resp:  [18-20] 18  SpO2:  [92 %-98 %] 97 %  BP: ()/(49-69) 128/58     Weight: 100.8 kg (222 lb 3.2 oz)  Body mass index is 33.79 kg/m².    Intake/Output Summary (Last 24 hours) at 3/12/2025 1530  Last data filed at 3/12/2025 1200  Gross per 24 hour   Intake 3829.17 ml   Output 1370 ml   Net 2459.17 ml         Physical Exam  Vitals and nursing note reviewed. Exam conducted with a chaperone present.   Constitutional:       General: He is not in acute distress.     Appearance: Normal appearance. He is normal weight. He is not ill-appearing.   Cardiovascular:      Rate and Rhythm: Normal rate and regular rhythm.      Pulses: Normal pulses.      Heart sounds: Normal heart sounds.   Pulmonary:      Effort: Pulmonary effort is normal.      Breath sounds: Normal breath sounds.   Abdominal:      General: Abdomen is flat.      Palpations: Abdomen is soft.      Tenderness: There is abdominal tenderness (RUQ, post surgical, trochar sites clean and dry).   Musculoskeletal:      Right lower leg: No edema.      Left lower leg: No edema.   Skin:     General:  Skin is warm and dry.      Findings: No erythema or rash.   Neurological:      General: No focal deficit present.      Mental Status: He is alert and oriented to person, place, and time.   Psychiatric:         Mood and Affect: Mood normal.         Behavior: Behavior normal.         Thought Content: Thought content normal.               Significant Labs: All pertinent labs within the past 24 hours have been reviewed.  CBC:   Recent Labs   Lab 03/10/25  1912 03/11/25  0658 03/12/25  0453   WBC 20.65* 16.47* 10.31   HGB 16.5 14.5 13.4   HCT 50.2 44.9 40.0    140 143     CMP:   Recent Labs   Lab 03/10/25  1912 03/11/25  0658 03/12/25  0453 03/12/25  0931   * 135* 134* 131*   K 4.6 4.0 4.0 4.3   CL 96* 103 104 104   CO2 22 23 17* 15*   BUN 21* 20 28* 27*   CREATININE 0.98 0.81 1.04 0.88   CALCIUM 9.2 8.6 7.9* 7.9*   ALBUMIN 4.1 3.1*  --  2.7*   BILITOT 1.9* 1.4*  --  1.2*   ALKPHOS 98 95  --  114   AST 35 22  --  75*   ALT 26 17  --  54*       Significant Imaging: I have reviewed all pertinent imaging results/findings within the past 24 hours.

## 2025-03-12 NOTE — PLAN OF CARE
03/12/25 1010   Discharge Reassessment   Assessment Type Discharge Planning Reassessment   Did the patient's condition or plan change since previous assessment? Yes   Discharge Plan discussed with: Patient   Communicated JANEL with patient/caregiver Yes   Discharge Plan A Home   Discharge Plan B Home   DME Needed Upon Discharge  none   Transition of Care Barriers None   Why the patient remains in the hospital Requires continued medical care   Post-Acute Status   Coverage medicare   Discharge Delays None known at this time        Patient identifiers verified and correct for Alan Fairbanks.   LOC: The patient is awake, alert and aware of environment with an appropriate affect, the patient is oriented x 3 and speaking appropriately.   APPEARANCE: Patient appears comfortable and in no acute distress, patient is clean and well groomed.  SKIN: The skin is warm and dry, color consistent with ethnicity, patient has normal skin turgor and moist mucus membranes, skin intact, no breakdown or bruising noted.   MUSCULOSKELETAL: Patient moving all extremities spontaneously, no swelling noted.  RESPIRATORY: Airway is open and patent, respirations are spontaneous, patient has a normal effort and rate, no accessory muscle use noted, pt placed on continuous pulse ox with O2 sats noted at 97% on room air.  CARDIAC: Pt placed on cardiac monitor. Patient has a normal rate and regular rhythm, no edema noted, capillary refill < 3 seconds.   GASTRO: Soft and non tender to palpation, no distention noted, normoactive bowel sounds present in all four quadrants. Pt states bowel movements have been regular. Pt has ostomy to RUQ  : Pt denies any pain or frequency with urination.  NEURO: Pt opens eyes spontaneously, behavior appropriate to situation, follows commands, facial expression symmetrical, bilateral hand grasp equal and even, purposeful motor response noted, normal sensation in all extremities when touched with a finger.

## 2025-03-12 NOTE — PROGRESS NOTES
[facility]/Surg  [facility] Medicine  Progress Note    Patient Name: González Soni  MRN: [id number]  Patient Class: IP- Inpatient   Admission Date: 3/10/2025  Length of Stay: 2 days  Attending Physician: No att. providers found  Primary Care Provider: Naina Spear FNP-C        Subjective     Principal Problem:Acute gangrenous cholecystitis        HPI:  Patient is a 75-year-old man with history of hyperlipidemia, hypertension, diabetes mellitus type 2 who had arrived at the emergency room with complaints of nausea vomiting abdominal discomfort.     On Saturday patient had gone to a [employer] with a group of people that evening patient started to have some issues of right upper quadrant abdominal pain, sharp stabbing, rib pain made worse with breathing with associated symptoms of nausea and multiple episodes of nonbilious none hematemesis emesis.  Patient denies any fevers or chills but has had some persistent intermittent pain in that region.  He is also complained of some mid to lower back pain within the same area as well as multiple episodes of diarrhea.  However, he has not had any issues of diarrhea in the past 24 hours but persistent abdominal discomfort with continued nausea and vomiting.  Patient also has intermittent cough, body aches and some ear pain.     Patient states that nothing makes it worse other than deep breathing.  He has a decreased appetite.  Denies having similar symptoms or problems with indigestion after eating fatty foods in the past.  Patient denies any alcohol use for over 30 years.     Initial vitals in the emergency room 36.8 °C, 112 bpm, 18 respirations a minute, 155/83 mmHg 95% on room air.  Patient weighs 102.1 kg.     Review of patient's laboratory studies reveal patient to have a white blood cell count of 23.3, hemoglobin of 16, MCV of 91, platelet count of 173.  Neutrophil predominance with a left shift of 85.8%.     Patient has a sodium of 133,  potassium 4.6, chloride 97, carbon dioxide 19, BUN/creatinine 19/0.88.  Glucose of 176.  Total protein/albumin 6.6/4.1.  T. bili elevated at 1.9.  AST/ALT 36/23 with a normal alkaline phosphatase of 131.     Back in October patient's last hemoglobin A1c was elevated at 9.5.  Influenza A/B and COVID-19 testing were negative.  Urinalysis shows no evidence of UTI but does show evidence of glucosuria.     CT of the abdomen and pelvis without IV contrast reveals patient to have evidence of acute cholecystitis.     Patient is admitted to observation services for issues of acute cholecystitis.    Overview/Hospital Course:  03/11/2025 Pt admitted for RUQ pain thought to be cholecysitis, Surgery consulted and cardiac clearance has been ordered and pending this am.  C/o persistent RUQ pain.  03/12/2025 s/p lap amandeep, temp 100.1 overnight discussed with Dr. Isabella aiken with continuing iv abx x 24-48hrs to reduce fever,  gangrenouse cholecystitis noted at time of surgery.    Interval History:      Review of Systems   Constitutional:  Negative for appetite change, fatigue and fever.   Respiratory:  Negative for cough, shortness of breath and wheezing.    Cardiovascular:  Negative for chest pain and leg swelling.   Gastrointestinal:  Positive for abdominal pain and nausea. Negative for abdominal distention, constipation, diarrhea and vomiting.   Skin:  Negative for color change, pallor, rash and wound.   Neurological:  Negative for tremors, syncope and headaches.   Psychiatric/Behavioral:  Negative for agitation and behavioral problems.      Objective:     Vital Signs (Most Recent):  Temp: 98.8 °F (37.1 °C) (03/12/25 1111)  Pulse: 75 (03/12/25 1111)  Resp: 18 (03/12/25 1111)  BP: (!) 128/58 (03/12/25 1111)  SpO2: 97 % (03/12/25 1111) Vital Signs (24h Range):  Temp:  [97.2 °F (36.2 °C)-100.1 °F (37.8 °C)] 98.8 °F (37.1 °C)  Pulse:  [60-92] 75  Resp:  [18-20] 18  SpO2:  [92 %-98 %] 97 %  BP: ()/(49-69) 128/58     Weight:  100.8 kg (222 lb 3.2 oz)  Body mass index is 33.79 kg/m².    Intake/Output Summary (Last 24 hours) at 3/12/2025 1530  Last data filed at 3/12/2025 1200  Gross per 24 hour   Intake 3829.17 ml   Output 1370 ml   Net 2459.17 ml         Physical Exam  Vitals and nursing note reviewed. Exam conducted with a chaperone present.   Constitutional:       General: He is not in acute distress.     Appearance: Normal appearance. He is normal weight. He is not ill-appearing.   Cardiovascular:      Rate and Rhythm: Normal rate and regular rhythm.      Pulses: Normal pulses.      Heart sounds: Normal heart sounds.   Pulmonary:      Effort: Pulmonary effort is normal.      Breath sounds: Normal breath sounds.   Abdominal:      General: Abdomen is flat.      Palpations: Abdomen is soft.      Tenderness: There is abdominal tenderness (RUQ, post surgical, trochar sites clean and dry).   Musculoskeletal:      Right lower leg: No edema.      Left lower leg: No edema.   Skin:     General: Skin is warm and dry.      Findings: No erythema or rash.   Neurological:      General: No focal deficit present.      Mental Status: He is alert and oriented to person, place, and time.   Psychiatric:         Mood and Affect: Mood normal.         Behavior: Behavior normal.         Thought Content: Thought content normal.               Significant Labs: All pertinent labs within the past 24 hours have been reviewed.  CBC:   Recent Labs   Lab 03/10/25  1912 03/11/25  0658 03/12/25  0453   WBC 20.65* 16.47* 10.31   HGB 16.5 14.5 13.4   HCT 50.2 44.9 40.0    140 143     CMP:   Recent Labs   Lab 03/10/25  1912 03/11/25  0658 03/12/25  0453 03/12/25  0931   * 135* 134* 131*   K 4.6 4.0 4.0 4.3   CL 96* 103 104 104   CO2 22 23 17* 15*   BUN 21* 20 28* 27*   CREATININE 0.98 0.81 1.04 0.88   CALCIUM 9.2 8.6 7.9* 7.9*   ALBUMIN 4.1 3.1*  --  2.7*   BILITOT 1.9* 1.4*  --  1.2*   ALKPHOS 98 95  --  114   AST 35 22  --  75*   ALT 26 17  --  54*  "      Significant Imaging: I have reviewed all pertinent imaging results/findings within the past 24 hours.      Assessment & Plan  Acute gangrenous cholecystitis  S/p lap amandeep  Continue iv abx  Tmax 100.1    Type 2 diabetes mellitus without complication, with long-term current use of insulin  Patient's FSGs are uncontrolled due to hyperglycemia on current medication regimen.  Last A1c reviewed-   Lab Results   Component Value Date    HGBA1C 8.6 (H) 03/12/2025     Most recent fingerstick glucose reviewed-   Recent Labs   Lab 03/11/25  1827 03/11/25  2117 03/12/25  0746   POCTGLUCOSE 176* 232* 227*     Current correctional scale  Low  Maintain anti-hyperglycemic dose as follows-   Antihyperglycemics (From admission, onward)      Start     Stop Route Frequency Ordered    03/11/25 0900  insulin glargine U-100 (Lantus) pen 40 Units         -- SubQ Daily 03/11/25 0002    03/11/25 0021  insulin aspart U-100 pen 0-5 Units         -- SubQ Every 6 hours PRN 03/11/25 0002          Hold Oral hypoglycemics while patient is in the hospital.      Hypercholesterolemia   Patient is chronically on statin.will continue for now. Monitor clinically. Last LDL was No results found for: "LDLCALC"       Hypertension  Patient's blood pressure range in the last 24 hours was: BP  Min: 92/56  Max: 161/69.The patient's inpatient anti-hypertensive regimen is listed below:  Current Antihypertensives  lisinopriL tablet 20 mg, Daily, Oral    Plan  - BP is controlled, no changes needed to their regimen  -    Class 2 severe obesity due to excess calories with serious comorbidity and body mass index (BMI) of 35.0 to 35.9 in adult  Body mass index is 33.79 kg/m². Morbid obesity complicates all aspects of disease management from diagnostic modalities to treatment. Weight loss encouraged and health benefits explained to patient.       Rib pain on right side  Likele due to cholecystitis    Abdominal pain  RUQ    Right upper quadrant abdominal " pain  Choelcystitis  Continue iv abx  Surgery following    VTE Risk Mitigation (From admission, onward)      None            Discharge Planning   JANEL: 3/12/2025     Code Status: Prior   Medical Readiness for Discharge Date:   Discharge Plan A: Home   Discharge Delays: None known at this time                    Tali Garcia MD  Department of Hospital Medicine   [facility]/Surg

## 2025-03-12 NOTE — PLAN OF CARE
03/12/25 1422   Final Note   Assessment Type Final Discharge Note   Anticipated Discharge Disposition Home   What phone number can be called within the next 1-3 days to see how you are doing after discharge? [phone]   Post-Acute Status   Coverage medicare   Discharge Delays None known at this time        Walk in

## 2025-03-12 NOTE — PLAN OF CARE
Problem: Adult Inpatient Plan of Care  Goal: Plan of Care Review  Outcome: Progressing  Goal: Patient-Specific Goal (Individualized)  Outcome: Progressing  Goal: Absence of Hospital-Acquired Illness or Injury  Outcome: Progressing  Goal: Optimal Comfort and Wellbeing  Outcome: Progressing  Intervention: Provide Person-Centered Care  Flowsheets (Taken 3/12/2025 0615)  Trust Relationship/Rapport:   care explained   questions encouraged   questions answered  Goal: Readiness for Transition of Care  Outcome: Progressing     Problem: Fall Injury Risk  Goal: Absence of Fall and Fall-Related Injury  Outcome: Progressing  Intervention: Promote Injury-Free Environment  Flowsheets (Taken 3/12/2025 0615)  Safety Promotion/Fall Prevention:   assistive device/personal item within reach   bed alarm set   instructed to call staff for mobility   lighting adjusted   nonskid shoes/socks when out of bed   pulse ox   room near unit station   side rails raised x 2   supervised activity     Problem: Infection  Goal: Absence of Infection Signs and Symptoms  Outcome: Progressing     Problem: Wound  Goal: Optimal Coping  Outcome: Progressing  Goal: Optimal Functional Ability  Outcome: Progressing  Goal: Absence of Infection Signs and Symptoms  Outcome: Progressing  Goal: Improved Oral Intake  Outcome: Progressing  Goal: Optimal Pain Control and Function  Outcome: Progressing  Goal: Skin Health and Integrity  Outcome: Progressing  Goal: Optimal Wound Healing  Outcome: Progressing     Problem: Cholecystectomy  Goal: Absence of Bleeding  Outcome: Progressing  Goal: Fluid and Electrolyte Balance  Outcome: Progressing  Goal: Absence of Infection Signs and Symptoms  Outcome: Progressing  Goal: Anesthesia/Sedation Recovery  Outcome: Progressing  Goal: Pain Control and Function  Outcome: Progressing  Goal: Nausea and Vomiting Relief  Outcome: Progressing  Goal: Effective Urinary Elimination  Outcome: Progressing  Goal: Effective Oxygenation and  Ventilation  Outcome: Progressing     Problem: Diabetes Comorbidity  Goal: Blood Glucose Level Within Targeted Range  Outcome: Not Progressing  Intervention: Monitor and Manage Glycemia  Flowsheets (Taken 3/12/2025 0615)  Glycemic Management: blood glucose monitored     Problem: Cholecystectomy  Goal: Effective Bowel Elimination  Outcome: Not Progressing  Intervention: Enhance Bowel Motility and Elimination  Flowsheets (Taken 3/12/2025 0615)  Bowel Motility Enhancement:   ambulation promoted   fluid intake encouraged

## 2025-03-13 VITALS
SYSTOLIC BLOOD PRESSURE: 134 MMHG | RESPIRATION RATE: 15 BRPM | HEART RATE: 73 BPM | OXYGEN SATURATION: 95 % | DIASTOLIC BLOOD PRESSURE: 63 MMHG | BODY MASS INDEX: 33.67 KG/M2 | WEIGHT: 222.19 LBS | TEMPERATURE: 98 F | HEIGHT: 68 IN

## 2025-03-13 LAB
ANION GAP SERPL CALC-SCNC: 5 MEQ/L (ref 2–13)
BASOPHILS # BLD AUTO: 0.03 X10(3)/MCL (ref 0.01–0.08)
BASOPHILS NFR BLD AUTO: 0.5 % (ref 0.1–1.2)
BUN SERPL-MCNC: 14 MG/DL (ref 7–20)
CALCIUM SERPL-MCNC: 7.7 MG/DL (ref 8.4–10.2)
CHLORIDE SERPL-SCNC: 105 MMOL/L (ref 98–110)
CO2 SERPL-SCNC: 22 MMOL/L (ref 21–32)
CREAT SERPL-MCNC: 0.64 MG/DL (ref 0.66–1.25)
CREAT/UREA NIT SERPL: 22 (ref 12–20)
EOSINOPHIL # BLD AUTO: 0.22 X10(3)/MCL (ref 0.04–0.54)
EOSINOPHIL NFR BLD AUTO: 3.6 % (ref 0.7–7)
ERYTHROCYTE [DISTWIDTH] IN BLOOD BY AUTOMATED COUNT: 12.9 %
GFR SERPLBLD CREATININE-BSD FMLA CKD-EPI: >90 ML/MIN/1.73/M2
GLUCOSE SERPL-MCNC: 136 MG/DL (ref 70–115)
HCT VFR BLD AUTO: 38.9 % (ref 36–52)
HGB BLD-MCNC: 13.2 G/DL (ref 13–18)
IMM GRANULOCYTES # BLD AUTO: 0.04 X10(3)/MCL (ref 0–0.03)
IMM GRANULOCYTES NFR BLD AUTO: 0.6 % (ref 0–0.5)
LYMPHOCYTES # BLD AUTO: 1.19 X10(3)/MCL (ref 1.32–3.57)
LYMPHOCYTES NFR BLD AUTO: 19.2 % (ref 20–55)
MCH RBC QN AUTO: 30.6 PG (ref 27–34)
MCHC RBC AUTO-ENTMCNC: 33.9 G/DL (ref 31–37)
MCV RBC AUTO: 90.3 FL (ref 79–99)
MONOCYTES # BLD AUTO: 0.55 X10(3)/MCL (ref 0.3–0.82)
MONOCYTES NFR BLD AUTO: 8.9 % (ref 4.7–12.5)
NEUTROPHILS # BLD AUTO: 4.16 X10(3)/MCL (ref 1.78–5.38)
NEUTROPHILS NFR BLD AUTO: 67.2 % (ref 37–73)
NRBC BLD AUTO-RTO: 0 %
PLATELET # BLD AUTO: 148 X10(3)/MCL (ref 140–371)
PMV BLD AUTO: 11.3 FL (ref 9.4–12.4)
POCT GLUCOSE: 133 MG/DL (ref 70–110)
POCT GLUCOSE: 292 MG/DL (ref 70–110)
POTASSIUM SERPL-SCNC: 4.2 MMOL/L (ref 3.5–5.1)
RBC # BLD AUTO: 4.31 X10(6)/MCL (ref 4–6)
SODIUM SERPL-SCNC: 132 MMOL/L (ref 136–145)
WBC # BLD AUTO: 6.19 X10(3)/MCL (ref 4–11.5)

## 2025-03-13 PROCEDURE — 25000003 PHARM REV CODE 250: Performed by: SURGERY

## 2025-03-13 PROCEDURE — 85025 COMPLETE CBC W/AUTO DIFF WBC: CPT | Performed by: SURGERY

## 2025-03-13 PROCEDURE — 36415 COLL VENOUS BLD VENIPUNCTURE: CPT | Performed by: SURGERY

## 2025-03-13 PROCEDURE — 80048 BASIC METABOLIC PNL TOTAL CA: CPT | Performed by: SURGERY

## 2025-03-13 PROCEDURE — 63600175 PHARM REV CODE 636 W HCPCS: Performed by: SURGERY

## 2025-03-13 RX ORDER — METRONIDAZOLE 500 MG/1
500 TABLET ORAL 3 TIMES DAILY
Qty: 30 TABLET | Refills: 0 | Status: SHIPPED | OUTPATIENT
Start: 2025-03-13 | End: 2025-03-23

## 2025-03-13 RX ORDER — LEVOFLOXACIN 500 MG/1
500 TABLET, FILM COATED ORAL DAILY
Qty: 10 TABLET | Refills: 0 | Status: SHIPPED | OUTPATIENT
Start: 2025-03-13 | End: 2025-03-23

## 2025-03-13 RX ADMIN — CYANOCOBALAMIN TAB 1000 MCG 5000 MCG: 1000 TAB at 10:03

## 2025-03-13 RX ADMIN — INSULIN GLARGINE 40 UNITS: 100 INJECTION, SOLUTION SUBCUTANEOUS at 10:03

## 2025-03-13 RX ADMIN — LISINOPRIL 20 MG: 10 TABLET ORAL at 10:03

## 2025-03-13 RX ADMIN — METRONIDAZOLE 500 MG: 5 INJECTION, SOLUTION INTRAVENOUS at 04:03

## 2025-03-13 RX ADMIN — ASPIRIN 81 MG: 81 TABLET ORAL at 10:03

## 2025-03-13 RX ADMIN — PIPERACILLIN AND TAZOBACTAM 4.5 G: 4; .5 INJECTION, POWDER, FOR SOLUTION INTRAVENOUS; PARENTERAL at 05:03

## 2025-03-13 NOTE — DISCHARGE SUMMARY
[facility]/Surg  Hospital Medicine  Discharge Summary      Patient Name: González Soni  MRN: [id number]  [facility]: [id number]  Patient Class: IP- Inpatient  Admission Date: 3/10/2025  Hospital Length of Stay: 3 days  Discharge Date and Time: No discharge date for patient encounter.  Attending Physician: No att. providers found   Discharging Provider: Tali Garcia MD  Primary Care Provider: Naina Spear FNP-C    Primary Care Team: Networked reference to record PCT     HPI:   Patient is a 75-year-old man with history of hyperlipidemia, hypertension, diabetes mellitus type 2 who had arrived at the emergency room with complaints of nausea vomiting abdominal discomfort.     On Saturday patient had gone to a [employer] with a group of people that evening patient started to have some issues of right upper quadrant abdominal pain, sharp stabbing, rib pain made worse with breathing with associated symptoms of nausea and multiple episodes of nonbilious none hematemesis emesis.  Patient denies any fevers or chills but has had some persistent intermittent pain in that region.  He is also complained of some mid to lower back pain within the same area as well as multiple episodes of diarrhea.  However, he has not had any issues of diarrhea in the past 24 hours but persistent abdominal discomfort with continued nausea and vomiting.  Patient also has intermittent cough, body aches and some ear pain.     Patient states that nothing makes it worse other than deep breathing.  He has a decreased appetite.  Denies having similar symptoms or problems with indigestion after eating fatty foods in the past.  Patient denies any alcohol use for over 30 years.     Initial vitals in the emergency room 36.8 °C, 112 bpm, 18 respirations a minute, 155/83 mmHg 95% on room air.  Patient weighs 102.1 kg.     Review of patient's laboratory studies reveal patient to have a white blood cell count of 23.3, hemoglobin of 16, MCV  of 91, platelet count of 173.  Neutrophil predominance with a left shift of 85.8%.     Patient has a sodium of 133, potassium 4.6, chloride 97, carbon dioxide 19, BUN/creatinine 19/0.88.  Glucose of 176.  Total protein/albumin 6.6/4.1.  T. bili elevated at 1.9.  AST/ALT 36/23 with a normal alkaline phosphatase of 131.     Back in October patient's last hemoglobin A1c was elevated at 9.5.  Influenza A/B and COVID-19 testing were negative.  Urinalysis shows no evidence of UTI but does show evidence of glucosuria.     CT of the abdomen and pelvis without IV contrast reveals patient to have evidence of acute cholecystitis.     Patient is admitted to observation services for issues of acute cholecystitis.    Procedure(s) (LRB):  CHOLECYSTECTOMY, LAPAROSCOPIC (N/A)      Hospital Course:   03/11/2025 Pt admitted for RUQ pain thought to be cholecysitis, Surgery consulted and cardiac clearance has been ordered and pending this am.  C/o persistent RUQ pain.  03/12/2025 s/p lap amandeep, temp 100.1 overnight discussed with Dr. Mason agree with continuing iv abx x 24-48hrs to reduce fever,  gangrenouse cholecystitis noted at time of surgery.  03/13/2025 DISCHARGE SUMMARY: Pt afebrile overnight last 24 hr tmax 99.1 he is feeling much better, tolerating diet, 70cc from GABO drain overnight.  He is anxious for d/c home.  Will d/c home with 10 d of flagyl and levaquin per surgery request and he will f/u with Dr. Mason to remove the drain.     Goals of Care Treatment Preferences:  Code Status: Full Code      SDOH Screening:  The patient was screened for utility difficulties, food insecurity, transport difficulties, housing insecurity, and interpersonal safety and there were no concerns identified this admission.     Consults:   Consults (From admission, onward)          Status Ordering Provider     Inpatient consult to Cardiology  Once        Provider:  Toni Bang MD    Completed NIRANJAN MASON     Inpatient consult to Cardiology   Once        Provider:  Jered Ramirez MD    Acknowledged NIRANJAN DONOHUE     Inpatient virtual consult to Hospital Medicine  Once        Provider:  Tali Garcia MD    Acknowledged NIRANJAN DONOHUE     Inpatient consult to General surgery  Once        Provider:  (Not yet assigned)    Acknowledged NIRANJAN DONOHUE            Assessment & Plan  Acute gangrenous cholecystitis  S/p lap amandeep  Improving  Ready for d/c home  F/u with surgery    Type 2 diabetes mellitus without complication, with long-term current use of insulin  Patient's FSGs are uncontrolled due to hyperglycemia on current medication regimen.  Last A1c reviewed-   Lab Results   Component Value Date    HGBA1C 8.6 (H) 03/12/2025     Most recent fingerstick glucose reviewed-   Recent Labs   Lab 03/12/25  1112 03/12/25  1728 03/12/25  1949 03/13/25  0744   POCTGLUCOSE 259* 196* 239* 133*     Current correctional scale  Low  Maintain anti-hyperglycemic dose as follows-   Antihyperglycemics (From admission, onward)      Start     Stop Route Frequency Ordered    03/11/25 0900  insulin glargine U-100 (Lantus) pen 40 Units         -- SubQ Daily 03/11/25 0002    03/11/25 0021  insulin aspart U-100 pen 0-5 Units         -- SubQ Every 6 hours PRN 03/11/25 0002          Hold Oral hypoglycemics while patient is in the hospital.  Resume shiraz emeds at d/c      Hypercholesterolemia  Resume home meds at d/c      Hypertension  Patient's blood pressure range in the last 24 hours was: BP  Min: 121/53  Max: 137/61.The patient's inpatient anti-hypertensive regimen is listed below:  Current Antihypertensives  lisinopriL tablet 20 mg, Daily, Oral    Plan  - BP is controlled, no changes needed to their regimen  -  continue home meds at d/c  Resume and continue home meds at discharge    Class 2 severe obesity due to excess calories with serious comorbidity and body mass index (BMI) of 35.0 to 35.9 in adult  Body mass index is 33.79 kg/m². Morbid obesity complicates all aspects  of disease management from diagnostic modalities to treatment. Weight loss encouraged and health benefits explained to patient.       Rib pain on right side  Likele due to cholecystitis  improving    Abdominal pain  RUQ imrpoved    Right upper quadrant abdominal pain  Choelcystitis  F/u with surgery  improving    Final Active Diagnoses:    Diagnosis Date Noted POA    PRINCIPAL PROBLEM:  Acute gangrenous cholecystitis [K81.0] 03/10/2025 Yes    Right upper quadrant abdominal pain [R10.11] 03/11/2025 Yes    Rib pain on right side [R07.81] 03/10/2025 Yes    Abdominal pain [R10.9] 03/10/2025 Yes    Class 2 severe obesity due to excess calories with serious comorbidity and body mass index (BMI) of 35.0 to 35.9 in adult [E66.812, E66.01, Z68.35] 09/24/2024 Not Applicable    Hypercholesterolemia [E78.00] 06/28/2023 Yes    Type 2 diabetes mellitus without complication, with long-term current use of insulin [E11.9, Z79.4] 06/28/2023 Not Applicable    Hypertension [I10] 06/28/2023 Yes      Problems Resolved During this Admission:       Discharged Condition: good    Disposition: Home or Self Care    Follow Up:   Follow-up Information       Naina Spear FNP-C. Go in 1 day(s).    Specialty: Family Medicine  Why: Follow-up appointment with Naina Ovalle tomorrow on 3/13/25 at 10:30 AM at her clinic in [place]  Contact information:  [address] [facility] [address] [phone]               Gale Mason MD. Go in 1 week(s).    Specialty: General Surgery  Why: Follow-up appointment with Dr. Mason at his clinic in [place] on 3/19/25 at 10:15 AM.  Contact information:  [address]  [facility] [address]                           Patient Instructions:      Activity as tolerated       Significant Diagnostic Studies: Labs: CMP   Recent Labs   Lab 03/12/25  0453 03/12/25  0931 03/13/25  0619   * 131* 132*   K 4.0 4.3 4.2    104 105   CO2 17* 15* 22   BUN 28* 27* 14   CREATININE 1.04 0.88 0.64*  "  CALCIUM 7.9* 7.9* 7.7*   ALBUMIN  --  2.7*  --    BILITOT  --  1.2*  --    ALKPHOS  --  114  --    AST  --  75*  --    ALT  --  54*  --     and CBC   Recent Labs   Lab 03/12/25  0453 03/13/25  0619   WBC 10.31 6.19   HGB 13.4 13.2   HCT 40.0 38.9    148       Pending Diagnostic Studies:       Procedure Component Value Units Date/Time    Specimen to Pathology General Surgery [[id number]] Collected: 03/11/25 1642    Order Status: Sent Lab Status: No result     Specimen: Tissue            Medications:  Reconciled Home Medications:      Medication List        START taking these medications      levoFLOXacin 500 MG tablet  Commonly known as: LEVAQUIN  Take 1 tablet (500 mg total) by mouth once daily. for 10 days     metroNIDAZOLE 500 MG tablet  Commonly known as: FLAGYL  Take 1 tablet (500 mg total) by mouth 3 (three) times daily. for 10 days            CHANGE how you take these medications      insulin degludec 200 unit/mL (3 mL) insulin pen  Commonly known as: TRESIBA FLEXTOUCH U-200  Inject 60 Units into the skin once daily.            CONTINUE taking these medications      aspirin 81 MG EC tablet  Commonly known as: ECOTRIN  Take 1 tablet (81 mg total) by mouth once daily.     CONTOUR NEXT TEST STRIPS Strp  Generic drug: blood sugar diagnostic  Use as directed to check blood glucose FOUR TIMES A DAY.     dapagliflozin propanediol 10 mg tablet  Commonly known as: FARXIGA  Take 1 tablet (10 mg total) by mouth every morning.     ezetimibe 10 mg tablet  Commonly known as: ZETIA  Take 1 tablet (10 mg total) by mouth once daily.     FIASP FLEXTOUCH U-100 INSULIN SUBQ     lisinopriL 20 MG tablet  Commonly known as: PRINIVIL,ZESTRIL  Take 1 tablet (20 mg total) by mouth once daily.     ondansetron 4 MG Tbdl  Commonly known as: ZOFRAN-ODT  Take 1 tablet (4 mg total) by mouth every 8 (eight) hours as needed (nausea and vomiting).     TECHLITE PEN NEEDLE 32 gauge x 5/32" Ndle  Generic drug: pen needle, diabetic  USE " with insulin FOUR TIMES A DAY     VITAMIN B-12 1000 MCG tablet  Generic drug: cyanocobalamin  Take 5,000 mcg by mouth once daily.              Indwelling Lines/Drains at time of discharge:   Lines/Drains/Airways       Drain  Duration                  Closed/Suction Drain 03/11/25 1706 Tube - 1 Lateral RLQ 7 Fr. 1 day                    Time spent on the discharge of patient: 37 minutes     Patient Screened for food insecurity, housing instability, transportation needs, utility difficulties, and interpersonal safety.  No needs identified    Physical Exam  Vitals and nursing note reviewed. Exam conducted with a chaperone present.   Constitutional:       General: He is not in acute distress.     Appearance: Normal appearance. He is normal weight. He is not ill-appearing.   HENT:      Head: Normocephalic and atraumatic.   Cardiovascular:      Rate and Rhythm: Normal rate and regular rhythm.      Pulses: Normal pulses.      Heart sounds: Normal heart sounds.   Pulmonary:      Effort: Pulmonary effort is normal.      Breath sounds: Normal breath sounds.   Abdominal:      General: Abdomen is flat. Bowel sounds are normal.      Palpations: Abdomen is soft.   Skin:     General: Skin is warm and dry.      Findings: No erythema or rash.   Neurological:      Mental Status: He is alert.   Psychiatric:      Comments: I had a face to face encounter with this patient prior to discharging         Tali Garcia MD  Department of Hospital Medicine  [facility]/Surg

## 2025-03-13 NOTE — ASSESSMENT & PLAN NOTE
Patient's blood pressure range in the last 24 hours was: BP  Min: 121/53  Max: 137/61.The patient's inpatient anti-hypertensive regimen is listed below:  Current Antihypertensives  lisinopriL tablet 20 mg, Daily, Oral    Plan  - BP is controlled, no changes needed to their regimen  -  continue home meds at d/c  Resume and continue home meds at discharge

## 2025-03-13 NOTE — DISCHARGE INSTRUCTIONS
OK to remove outer medipore and gauze dressings tomorrow morning, leave steri-strips intact until follow-up.   Monitor bulb drainage for dark yellow (purulent) color. Ok if light yellow/orange drainage.    Report to ER if fevers 101F or greater. 1-2 mild fever's (less than 101F) is acceptable.    Report to ER if worsening or intolerable pain to incision sites.

## 2025-03-13 NOTE — PROGRESS NOTES
[facility]/Surg  General Surgery  Progress Note    Subjective:     Interval History:  No acute events reported by nursing staff the patient overnight.  Patient stated he feels much better today.  Add 1 very low-grade fever.  Leukocytosis has normalized.  Shown no signs of sepsis.  Tolerating his diet.  Appears to be stable for discharge to home.    Post-Op Info:  Procedure(s) (LRB):  CHOLECYSTECTOMY, LAPAROSCOPIC (N/A)   2 Days Post-Op      Medications:  Continuous Infusions:  Scheduled Meds:   aspirin  81 mg Oral Daily    cyanocobalamin  5,000 mcg Oral Daily    insulin glargine U-100  40 Units Subcutaneous Daily    lisinopriL  20 mg Oral Daily    metroNIDAZOLE IV (PEDS and ADULTS)  500 mg Intravenous Q8H    piperacillin-tazobactam (Zosyn) IV (PEDS and ADULTS) (extended infusion is not appropriate)  4.5 g Intravenous Q8H     PRN Meds:  Current Facility-Administered Medications:     acetaminophen, 650 mg, Oral, Q6H PRN    calcium carbonate, 1,000 mg, Oral, TID PRN    dextrose 50%, 12.5 g, Intravenous, PRN    docusate sodium, 100 mg, Oral, BID PRN    glucagon (human recombinant), 1 mg, Intramuscular, PRN    insulin aspart U-100, 0-5 Units, Subcutaneous, Q6H PRN    morphine, 2 mg, Intravenous, Q4H PRN    naloxone, 0.4 mg, Intravenous, PRN    ondansetron, 4 mg, Intravenous, Q6H PRN     Objective:     Vital Signs (Most Recent):  Temp: 98.4 °F (36.9 °C) (03/13/25 1115)  Pulse: 73 (03/13/25 1115)  Resp: 15 (03/13/25 1115)  BP: 134/63 (03/13/25 1115)  SpO2: 95 % (03/13/25 1115) Vital Signs (24h Range):  Temp:  [98 °F (36.7 °C)-99.1 °F (37.3 °C)] 98.4 °F (36.9 °C)  Pulse:  [68-91] 73  Resp:  [15-20] 15  SpO2:  [95 %-98 %] 95 %  BP: (121-137)/(53-65) 134/63       Intake/Output Summary (Last 24 hours) at 3/13/2025 1221  Last data filed at 3/13/2025 0845  Gross per 24 hour   Intake 1520 ml   Output 2205 ml   Net -685 ml       Physical Exam  Abdominal:      General: Abdomen is flat. There is no distension.       Palpations: Abdomen is soft. There is no mass.      Tenderness: There is no abdominal tenderness. There is no right CVA tenderness, left CVA tenderness, guarding or rebound.      Hernia: No hernia is present.      Comments: Trocar sites appeared to be healing well, GABO drain out the right lower quadrant is serosanguineous and: No signs concerning for enteric content or bile         Significant Labs:  CBC:   Recent Labs   Lab 03/13/25  0619   WBC 6.19   RBC 4.31   HGB 13.2   HCT 38.9      MCV 90.3   MCH 30.6   MCHC 33.9     CMP:   Recent Labs   Lab 03/12/25  0931 03/13/25  0619   CALCIUM 7.9* 7.7*   ALBUMIN 2.7*  --    * 132*   K 4.3 4.2   CO2 15* 22    105   BUN 27* 14   CREATININE 0.88 0.64*   ALKPHOS 114  --    ALT 54*  --    AST 75*  --    BILITOT 1.2*  --        Significant Diagnostics:  None    Assessment/Plan:     Active Diagnoses:    Diagnosis Date Noted POA    PRINCIPAL PROBLEM:  Acute gangrenous cholecystitis [K81.0] 03/10/2025 Yes    Right upper quadrant abdominal pain [R10.11] 03/11/2025 Yes    Rib pain on right side [R07.81] 03/10/2025 Yes    Abdominal pain [R10.9] 03/10/2025 Yes    Class 2 severe obesity due to excess calories with serious comorbidity and body mass index (BMI) of 35.0 to 35.9 in adult [E66.812, E66.01, Z68.35] 09/24/2024 Not Applicable    Hypercholesterolemia [E78.00] 06/28/2023 Yes    Type 2 diabetes mellitus without complication, with long-term current use of insulin [E11.9, Z79.4] 06/28/2023 Not Applicable    Hypertension [I10] 06/28/2023 Yes      Problems Resolved During this Admission:     Patient appears to be stable for discharge to home with a follow up in surgery Clinic in 1 week.  Patient will be discharged with Levaquin and Flagyl for treatment of intra-abdominal infection related to acute gangrenous cholecystitis  Drain will remain in place to follow up in surgery Clinic    Gale Mason MD  General Surgery  [facility]

## 2025-03-13 NOTE — ASSESSMENT & PLAN NOTE
Patient's FSGs are uncontrolled due to hyperglycemia on current medication regimen.  Last A1c reviewed-   Lab Results   Component Value Date    HGBA1C 8.6 (H) 03/12/2025     Most recent fingerstick glucose reviewed-   Recent Labs   Lab 03/12/25  1112 03/12/25  1728 03/12/25  1949 03/13/25  0744   POCTGLUCOSE 259* 196* 239* 133*     Current correctional scale  Low  Maintain anti-hyperglycemic dose as follows-   Antihyperglycemics (From admission, onward)      Start     Stop Route Frequency Ordered    03/11/25 0900  insulin glargine U-100 (Lantus) pen 40 Units         -- SubQ Daily 03/11/25 0002    03/11/25 0021  insulin aspart U-100 pen 0-5 Units         -- SubQ Every 6 hours PRN 03/11/25 0002          Hold Oral hypoglycemics while patient is in the hospital.  Resume shiraz emeds at d/c

## 2025-03-13 NOTE — PLAN OF CARE
Problem: Adult Inpatient Plan of Care  Goal: Plan of Care Review  Outcome: Progressing  Goal: Patient-Specific Goal (Individualized)  Outcome: Progressing  Goal: Absence of Hospital-Acquired Illness or Injury  Outcome: Progressing  Goal: Optimal Comfort and Wellbeing  Outcome: Progressing  Goal: Readiness for Transition of Care  Outcome: Progressing     Problem: Diabetes Comorbidity  Goal: Blood Glucose Level Within Targeted Range  Outcome: Progressing     Problem: Fall Injury Risk  Goal: Absence of Fall and Fall-Related Injury  Outcome: Progressing     Problem: Infection  Goal: Absence of Infection Signs and Symptoms  Outcome: Progressing     Problem: Wound  Goal: Optimal Coping  Outcome: Progressing  Goal: Optimal Functional Ability  Outcome: Progressing  Goal: Absence of Infection Signs and Symptoms  Outcome: Progressing  Goal: Improved Oral Intake  Outcome: Progressing  Goal: Optimal Pain Control and Function  Outcome: Progressing  Goal: Skin Health and Integrity  Outcome: Progressing  Goal: Optimal Wound Healing  Outcome: Progressing     Problem: Cholecystectomy  Goal: Absence of Bleeding  Outcome: Progressing  Goal: Effective Bowel Elimination  Outcome: Progressing  Goal: Fluid and Electrolyte Balance  Outcome: Progressing  Goal: Absence of Infection Signs and Symptoms  Outcome: Progressing  Goal: Anesthesia/Sedation Recovery  Outcome: Progressing  Goal: Pain Control and Function  Outcome: Progressing  Goal: Nausea and Vomiting Relief  Outcome: Progressing  Goal: Effective Urinary Elimination  Outcome: Progressing  Goal: Effective Oxygenation and Ventilation  Outcome: Progressing      Keystone Flap Text: Given the location of the defect, the surrounding tension, to facilitate healing, and the shape of the defect, a keystone flap was planned.  Using a sterile surgical marker, an appropriate keystone flap was drawn incorporating the defect, outlining the appropriate donor tissue and placing the expected incisions within the relaxed skin tension lines where possible. The area thus outlined was incised deep to adipose tissue with a #15 scalpel blade.  The skin margins were undermined to an appropriate distance in all directions around the primary defect and laterally outward around the flap utilizing iris scissors.  The wound edges were sutured in a layered fashion.

## 2025-03-13 NOTE — PLAN OF CARE
Problem: Adult Inpatient Plan of Care  Goal: Plan of Care Review  3/13/2025 1306 by Jerman Greer RN  Outcome: Met  3/13/2025 1226 by Jerman Greer RN  Outcome: Progressing  Goal: Patient-Specific Goal (Individualized)  3/13/2025 1306 by Jerman Greer RN  Outcome: Met  3/13/2025 1226 by Jerman Greer RN  Outcome: Progressing  Goal: Absence of Hospital-Acquired Illness or Injury  3/13/2025 1306 by Jerman Greer RN  Outcome: Met  3/13/2025 1226 by Jerman Greer RN  Outcome: Progressing  Goal: Optimal Comfort and Wellbeing  3/13/2025 1306 by Jerman Greer RN  Outcome: Met  3/13/2025 1226 by Jerman Greer RN  Outcome: Progressing  Goal: Readiness for Transition of Care  3/13/2025 1306 by Jerman Greer RN  Outcome: Met  3/13/2025 1226 by Jerman Greer RN  Outcome: Progressing     Problem: Diabetes Comorbidity  Goal: Blood Glucose Level Within Targeted Range  3/13/2025 1306 by Jerman Greer RN  Outcome: Met  3/13/2025 1226 by Jerman Greer RN  Outcome: Progressing     Problem: Fall Injury Risk  Goal: Absence of Fall and Fall-Related Injury  3/13/2025 1306 by Jerman Greer RN  Outcome: Met  3/13/2025 1226 by Jerman Greer RN  Outcome: Progressing     Problem: Infection  Goal: Absence of Infection Signs and Symptoms  3/13/2025 1306 by Jerman Greer RN  Outcome: Met  3/13/2025 1226 by Jerman Greer RN  Outcome: Progressing     Problem: Wound  Goal: Optimal Coping  3/13/2025 1306 by Jerman Greer RN  Outcome: Met  3/13/2025 1226 by Jerman Greer RN  Outcome: Progressing  Goal: Optimal Functional Ability  3/13/2025 1306 by Jerman Greer RN  Outcome: Met  3/13/2025 1226 by Jerman Greer RN  Outcome: Progressing  Goal: Absence of Infection Signs and Symptoms  3/13/2025 1306 by Jerman Greer, RN  Outcome: Met  3/13/2025 1226 by Jerman Greer, RN  Outcome: Progressing  Goal:  Improved Oral Intake  3/13/2025 1306 by Jerman Greer RN  Outcome: Met  3/13/2025 1226 by Jerman Greer RN  Outcome: Progressing  Goal: Optimal Pain Control and Function  3/13/2025 1306 by Jerman Greer RN  Outcome: Met  3/13/2025 1226 by Jerman Greer RN  Outcome: Progressing  Goal: Skin Health and Integrity  3/13/2025 1306 by Jerman Greer RN  Outcome: Met  3/13/2025 1226 by Jerman Greer RN  Outcome: Progressing  Goal: Optimal Wound Healing  3/13/2025 1306 by Jerman Greer RN  Outcome: Met  3/13/2025 1226 by Jerman Greer RN  Outcome: Progressing     Problem: Cholecystectomy  Goal: Absence of Bleeding  3/13/2025 1306 by Jerman Greer RN  Outcome: Met  3/13/2025 1226 by Jerman Greer RN  Outcome: Progressing  Goal: Effective Bowel Elimination  3/13/2025 1306 by Jerman Greer RN  Outcome: Met  3/13/2025 1226 by Jerman Greer RN  Outcome: Progressing  Goal: Fluid and Electrolyte Balance  3/13/2025 1306 by Jerman Greer RN  Outcome: Met  3/13/2025 1226 by Jerman Greer RN  Outcome: Progressing  Goal: Absence of Infection Signs and Symptoms  3/13/2025 1306 by Jerman Greer RN  Outcome: Met  3/13/2025 1226 by Jerman Greer RN  Outcome: Progressing  Goal: Anesthesia/Sedation Recovery  3/13/2025 1306 by Jerman Greer RN  Outcome: Met  3/13/2025 1226 by Jerman Greer RN  Outcome: Progressing  Goal: Pain Control and Function  3/13/2025 1306 by Jerman Greer RN  Outcome: Met  3/13/2025 1226 by Jerman Greer RN  Outcome: Progressing  Goal: Nausea and Vomiting Relief  3/13/2025 1306 by Jerman Greer RN  Outcome: Met  3/13/2025 1226 by Robicheaux, Jerman, RN  Outcome: Progressing  Goal: Effective Urinary Elimination  3/13/2025 1306 by Jerman Greer RN  Outcome: Met  3/13/2025 1226 by Jerman Greer RN  Outcome: Progressing  Goal: Effective Oxygenation and  Ventilation  3/13/2025 1306 by Jerman Greer, RN  Outcome: Met  3/13/2025 1226 by Jerman Greer, RN  Outcome: Progressing

## 2025-03-14 ENCOUNTER — PATIENT OUTREACH (OUTPATIENT)
Dept: ADMINISTRATIVE | Facility: CLINIC | Age: 75
End: 2025-03-14
Payer: MEDICARE

## 2025-03-14 ENCOUNTER — OFFICE VISIT (OUTPATIENT)
Dept: FAMILY MEDICINE | Facility: CLINIC | Age: 75
End: 2025-03-14
Payer: MEDICARE

## 2025-03-14 VITALS
HEIGHT: 68 IN | OXYGEN SATURATION: 96 % | SYSTOLIC BLOOD PRESSURE: 130 MMHG | HEART RATE: 74 BPM | DIASTOLIC BLOOD PRESSURE: 68 MMHG | WEIGHT: 231.63 LBS | BODY MASS INDEX: 35.11 KG/M2 | TEMPERATURE: 97 F

## 2025-03-14 DIAGNOSIS — K81.0 ACUTE GANGRENOUS CHOLECYSTITIS: ICD-10-CM

## 2025-03-14 DIAGNOSIS — Z09 HOSPITAL DISCHARGE FOLLOW-UP: Primary | ICD-10-CM

## 2025-03-14 PROBLEM — R10.11 RIGHT UPPER QUADRANT ABDOMINAL PAIN: Status: RESOLVED | Noted: 2025-03-11 | Resolved: 2025-03-14

## 2025-03-14 PROBLEM — R07.81 RIB PAIN ON RIGHT SIDE: Status: RESOLVED | Noted: 2025-03-10 | Resolved: 2025-03-14

## 2025-03-14 NOTE — PROGRESS NOTES
Patient ID: González Soni  : 1950    Chief Complaint: Hospital Follow Up    Allergies: Patient has no known allergies.     Subjective :  The patient is a 75 y.o. White male who presents to clinic for follow up on Hospital Follow Up   History of Present Illness    HPI:  Patient initially presented to the clinic on  with complaints of nausea, vomiting, diarrhea, and right upper abdominal pain. At that time, it was thought to be a viral illness as his wife had started feeling unwell on the same day, although she had upper respiratory symptoms without nausea or vomiting. Labs were drawn, and patient was advised to increase oral intake and sent home. When the lab results returned, they showed an elevated white blood cell count of 23.31, low sodium of 133, low chloride of 97, CO2 of 19, glucose of 176, and elevated bilirubin of 1.9. Patient was then advised by telephone to go to the ER for further workup.    Upon evaluation in the ER, patient was diagnosed with acute cholecystitis. He subsequently underwent a laparoscopic cholecystectomy and was discharged home on . His gallbladder was severely infected and described as gangrenous by the medical team.    Currently, patient has post-operative pain, which he describes as discomfort from the surgical procedure. He mentions feeling fatigued his hospital stay. Patient has been able to sleep in his own bed since discharge, following his regular sleeping pattern of rising early.    Patient has surgical dressings, with one site leaking and requiring changing. This leaking site corresponds to where a drain was placed. Patient has been instructed to keep the drain in place until his follow-up visit with Dr. Mason next week.    During his hospital stay, patient underwent additional cardiac tests due to calcification around his heart, which delayed surgical approval. He mentions a previous consultation with Dr. Ramirez in August regarding this  calcification, who had advised him to return if he had any heart pain.    Patient denies fever and any diagnoses of cancer.    MEDICAL HISTORY:  Patient has a history of acute cholecystitis, which was diagnosed during a recent ER visit. He was also found to have a gangrenous gallbladder during his recent hospitalization.    SURGICAL HISTORY:  Patient recently underwent a laparoscopic cholecystectomy for acute cholecystitis and gangrenous gallbladder. He was discharged on March 13th following this procedure.    TEST RESULTS:  Patient's recent lab results from 4 days ago show an elevated white blood cell count of 23,310/µL. His sodium level was 133 mEq/L, and chloride was low at 97 mEq/L. The CO2 level was 19 mEq/L, glucose was 176 mg/dL, and bilirubin was increased at 1.9 mg/dL. During his hospital stay, the patient underwent heart tests due to calcification around the heart.      ROS:  ROS as indicated in HPI.           Social History:  reports that he quit smoking about 11 years ago. His smoking use included cigarettes, pipe, and cigars. He started smoking about 49 years ago. He has a 38.8 pack-year smoking history. He has never used smokeless tobacco. He reports that he does not currently use alcohol after a past usage of about 3.0 standard drinks of alcohol per week. He reports that he does not use drugs.    Past Medical History:  has a past medical history of Diabetes mellitus, type 2, Essential tremor, Family history of colon cancer, Hypercholesterolemia, Hypertension, and Pernicious anemia.    Current Medications:  Current Outpatient Medications   Medication Instructions    aspirin (ECOTRIN) 81 mg, Oral, Daily    CONTOUR NEXT TEST STRIPS Strp Use as directed to check blood glucose FOUR TIMES A DAY.    cyanocobalamin (VITAMIN B-12) 5,000 mcg, Daily    dapagliflozin propanediol (FARXIGA) 10 mg, Oral, Every morning    ezetimibe (ZETIA) 10 mg, Oral, Daily    insulin aspart, niacinamide, (FIASP FLEXTOUCH U-100  "INSULIN SUBQ)     insulin degludec (TRESIBA FLEXTOUCH U-200) 60 Units, Subcutaneous, Daily    levoFLOXacin (LEVAQUIN) 500 mg, Oral, Daily    lisinopriL (PRINIVIL,ZESTRIL) 20 mg, Oral, Daily    metroNIDAZOLE (FLAGYL) 500 mg, Oral, 3 times daily    ondansetron (ZOFRAN-ODT) 4 mg, Oral, Every 8 hours PRN    TECHLITE PEN NEEDLE 32 gauge x 5/32" Ndle USE with insulin FOUR TIMES A DAY         Visit Vitals  /68 (BP Location: Left arm, Patient Position: Sitting)   Pulse 74   Temp 97.3 °F (36.3 °C) (Temporal)   Ht 5' 7.99" (1.727 m)   Wt 105.1 kg (231 lb 9.6 oz)   SpO2 96%   BMI 35.22 kg/m²       Physical Exam  Vitals reviewed.   Constitutional:       Appearance: Normal appearance. He is obese.   Eyes:      Conjunctiva/sclera: Conjunctivae normal.   Cardiovascular:      Rate and Rhythm: Normal rate and regular rhythm.   Pulmonary:      Breath sounds: Normal breath sounds.   Abdominal:      General: Bowel sounds are normal.      Palpations: Abdomen is soft.      Tenderness: There is abdominal tenderness (at sites of surgical incisions).      Comments: Has some saturation of the bandage at the right lateral incision with drain tube present   Skin:     General: Skin is warm and dry.          Labs Reviewed:  Chemistry:  Lab Results   Component Value Date     (L) 03/13/2025    K 4.2 03/13/2025    BUN 14 03/13/2025    CREATININE 0.64 (L) 03/13/2025    EGFRNORACEVR >90 03/13/2025    GLUCOSE 136 (H) 03/13/2025    CALCIUM 7.7 (L) 03/13/2025    ALKPHOS 114 03/12/2025    LABPROT 5.3 (L) 03/12/2025    ALBUMIN 2.7 (L) 03/12/2025    AST 75 (H) 03/12/2025    ALT 54 (H) 03/12/2025    MG 2.30 07/03/2024    TSH 0.880 07/03/2024    YFDXIA6DLGN 0.98 02/14/2024    PSA 0.34 06/25/2024        Lab Results   Component Value Date    HGBA1C 8.6 (H) 03/12/2025        Hematology:  Lab Results   Component Value Date    WBC 6.19 03/13/2025    RBC 4.31 03/13/2025    HGB 13.2 03/13/2025    HCT 38.9 03/13/2025    MCV 90.3 03/13/2025    MCH 30.6 " 03/13/2025    MCHC 33.9 03/13/2025    RDW 12.9 03/13/2025     03/13/2025    MPV 11.3 03/13/2025       Assessment & Plan:  1. Hospital discharge follow-up  Comments:  Recovering well.  Surgical incisions x3.  Drain tube present to lateral incision.  Dressing changed.  Encouraged to follow-up with Dr. Mason as scheduled    2. Acute gangrenous cholecystitis  Overview:  03/11/25:  Lap amandeep per Dr. Gale Mason           Assessment & Plan    Z09 Hospital discharge follow-up  K81.0 Acute gangrenous cholecystitis  E11.3592 Type 2 diabetes mellitus with proliferative diabetic retinopathy without macular edema, left eye    IMPRESSION:   Patient presented for hospital follow-up after recent cholecystectomy for acute cholecystitis.   Diagnosed with gangrenous cholecystitis, underwent laparoscopic cholecystectomy on 13th March.   Currently recovering post-surgery with some residual pain and fatigue.   Drain in place, 1 incision site leaking.   No signs of infection or fever noted.    Z09 HOSPITAL DISCHARGE FOLLOW-UP:   Scheduled follow-up visit with Dr. Mason next week on Wednesday morning for post-operative evaluation.    K81.0 ACUTE GANGRENOUS CHOLECYSTITIS:   Instructed the patient to maintain the drain in place until the post-operative appointment with Dr. Mason.    ** DIAGNOSES NOT IN VISIT DX OR FOR REVIEW **  E11.3592 TYPE 2 DIABETES MELLITUS WITH PROLIFERATIVE DIABETIC RETINOPATHY WITHOUT MACULAR EDEMA, LEFT EYE:   Monitored the patient's glucose level, which was 176 during the recent hospital visit.   Acknowledged the patient's recent hospitalization and surgery.   Administered multiple antibiotics during the patient's hospital stay.         Future Appointments   Date Time Provider Department Center   3/19/2025 10:50 AM Gale Mason MD [facility] Isabella Consu   9/25/2025  8:00 AM LAB, [facility] LABORATORY DRAW STATION [facility] MAJO Reyes   9/30/2025 10:30 [facility]       Follow up if symptoms worsen or fail to improve, for Keep appointment as scheduled. Call sooner if needed.    MIRANDA Santos    Lab Frequency Next Occurrence   Ambulatory referral/consult to Cardiology Once 07/19/2024   Comprehensive Metabolic Panel Once 09/24/2024   Hemoglobin A1C Once 09/24/2024   CBC Auto Differential Once 09/24/2025   Comprehensive Metabolic Panel Once 09/24/2025   Lipid Panel Once 09/24/2025   TSH Once 09/24/2025   Hemoglobin A1C Once 09/24/2025   Microalbumin/Creatinine Ratio, Urine Once 09/24/2025   PSA, Screening Once 09/24/2025            This note was generated with the assistance of ambient listening technology. Verbal consent was obtained by the patient and accompanying visitor(s) for the recording of patient appointment to facilitate this note. I attest to having reviewed and edited the generated note for accuracy, though some syntax or spelling errors may persist. Please contact the author of this note for any clarification.

## 2025-03-14 NOTE — PROGRESS NOTES
C3 nurse attempted to contact González Soni for a TCC post hospital discharge follow up call. No answer. Left voicemail with callback information. The patient has a scheduled HOSFU appointment with  Naina Ovalle on 3/13/25 at 8:30 AM.

## 2025-03-14 NOTE — PROGRESS NOTES
C3 nurse spoke with González Benderer wife for a TCC post hospital discharge follow up call. The patient has a scheduled [facility] appointment with Naina Spear FNP-C on 3/14/25 @8:30am.

## 2025-03-15 LAB — BACTERIA SPEC ANAEROBE CULT: NORMAL

## 2025-03-17 LAB — BACTERIA FLD CULT: NORMAL

## 2025-03-19 PROBLEM — Z48.89 POSTOPERATIVE VISIT: Status: ACTIVE | Noted: 2025-03-19

## 2025-03-19 LAB — BEAKER SEE SCANNED REPORT: NORMAL

## 2025-04-08 ENCOUNTER — TELEPHONE (OUTPATIENT)
Dept: FAMILY MEDICINE | Facility: CLINIC | Age: 75
End: 2025-04-08
Payer: MEDICARE

## 2025-04-08 DIAGNOSIS — E78.00 HYPERCHOLESTEROLEMIA: ICD-10-CM

## 2025-04-08 RX ORDER — EZETIMIBE 10 MG/1
10 TABLET ORAL DAILY
Qty: 30 TABLET | Refills: 11 | Status: SHIPPED | OUTPATIENT
Start: 2025-04-08

## 2025-04-25 ENCOUNTER — TELEPHONE (OUTPATIENT)
Dept: FAMILY MEDICINE | Facility: CLINIC | Age: 75
End: 2025-04-25
Payer: MEDICARE

## 2025-04-25 DIAGNOSIS — E11.9 TYPE 2 DIABETES MELLITUS WITHOUT COMPLICATION, WITH LONG-TERM CURRENT USE OF INSULIN: Primary | ICD-10-CM

## 2025-04-25 DIAGNOSIS — Z79.4 TYPE 2 DIABETES MELLITUS WITHOUT COMPLICATION, WITH LONG-TERM CURRENT USE OF INSULIN: Primary | ICD-10-CM

## 2025-04-25 RX ORDER — INSULIN GLARGINE 300 [IU]/ML
52 INJECTION, SOLUTION SUBCUTANEOUS DAILY
Qty: 5.2 ML | Refills: 11 | Status: SHIPPED | OUTPATIENT
Start: 2025-04-25 | End: 2026-04-25

## 2025-04-25 NOTE — TELEPHONE ENCOUNTER
New sent them a letter saying that Tresiba is no longer covered. On formulary is Toujeo Solostar U-300 Pen, Toujeo Max U-300 Solostar Pen, Lantus U-100 Solution, Lantus Solostar U-100 pen.     He is currently on 52 units every morning.

## 2025-05-06 ENCOUNTER — PATIENT OUTREACH (OUTPATIENT)
Facility: CLINIC | Age: 75
End: 2025-05-06
Payer: MEDICARE

## 2025-05-06 NOTE — PROGRESS NOTES
Health Maintenance Topic(s) Outreach Outcomes & Actions Taken:    Diabetic Foot Exam - Outreach Outcomes & Actions Taken  : note on appointment    Low Dose CT Screening - Outreach Outcomes & Actions Taken  : note on appointment    Eye Exam - Outreach Outcomes & Actions Taken  : due soon    Lab(s) - Outreach Outcomes & Actions Taken  : scheduled 8/7/25    Primary Care Appt - Outreach Outcomes & Actions Taken  : scheduled 8/20/25       Additional Notes:  No SDOH concerns at this time.

## 2025-07-21 NOTE — ASSESSMENT & PLAN NOTE
Body mass index is 33.79 kg/m². Morbid obesity complicates all aspects of disease management from diagnostic modalities to treatment. Weight loss encouraged and health benefits explained to patient.        132

## 2025-08-07 PROCEDURE — 84153 ASSAY OF PSA TOTAL: CPT | Performed by: NURSE PRACTITIONER

## 2025-08-07 PROCEDURE — 80053 COMPREHEN METABOLIC PANEL: CPT | Performed by: NURSE PRACTITIONER

## 2025-08-07 PROCEDURE — 84443 ASSAY THYROID STIM HORMONE: CPT | Performed by: NURSE PRACTITIONER

## 2025-08-07 PROCEDURE — 85025 COMPLETE CBC W/AUTO DIFF WBC: CPT | Performed by: NURSE PRACTITIONER

## 2025-08-07 PROCEDURE — 83036 HEMOGLOBIN GLYCOSYLATED A1C: CPT | Performed by: NURSE PRACTITIONER

## 2025-08-07 PROCEDURE — 80061 LIPID PANEL: CPT | Performed by: NURSE PRACTITIONER

## 2025-08-07 PROCEDURE — 82043 UR ALBUMIN QUANTITATIVE: CPT | Performed by: NURSE PRACTITIONER

## 2025-08-20 ENCOUNTER — OFFICE VISIT (OUTPATIENT)
Dept: FAMILY MEDICINE | Facility: CLINIC | Age: 75
End: 2025-08-20
Payer: MEDICARE

## 2025-08-20 ENCOUNTER — PATIENT OUTREACH (OUTPATIENT)
Facility: CLINIC | Age: 75
End: 2025-08-20
Payer: MEDICARE

## 2025-08-20 VITALS
OXYGEN SATURATION: 97 % | HEIGHT: 68 IN | TEMPERATURE: 97 F | DIASTOLIC BLOOD PRESSURE: 78 MMHG | WEIGHT: 223 LBS | HEART RATE: 65 BPM | SYSTOLIC BLOOD PRESSURE: 130 MMHG | BODY MASS INDEX: 33.8 KG/M2

## 2025-08-20 DIAGNOSIS — E78.00 HYPERCHOLESTEROLEMIA: ICD-10-CM

## 2025-08-20 DIAGNOSIS — E66.812 CLASS 2 SEVERE OBESITY DUE TO EXCESS CALORIES WITH SERIOUS COMORBIDITY AND BODY MASS INDEX (BMI) OF 35.0 TO 35.9 IN ADULT: ICD-10-CM

## 2025-08-20 DIAGNOSIS — E11.65 TYPE 2 DIABETES MELLITUS WITH HYPERGLYCEMIA, WITH LONG-TERM CURRENT USE OF INSULIN: Primary | ICD-10-CM

## 2025-08-20 DIAGNOSIS — I87.2 VENOUS STASIS DERMATITIS: ICD-10-CM

## 2025-08-20 DIAGNOSIS — Z79.4 TYPE 2 DIABETES MELLITUS WITH HYPERGLYCEMIA, WITH LONG-TERM CURRENT USE OF INSULIN: Primary | ICD-10-CM

## 2025-08-20 DIAGNOSIS — E66.01 CLASS 2 SEVERE OBESITY DUE TO EXCESS CALORIES WITH SERIOUS COMORBIDITY AND BODY MASS INDEX (BMI) OF 35.0 TO 35.9 IN ADULT: ICD-10-CM

## 2025-08-20 DIAGNOSIS — I10 PRIMARY HYPERTENSION: ICD-10-CM

## 2025-08-20 RX ORDER — ROSUVASTATIN CALCIUM 10 MG/1
10 TABLET, COATED ORAL NIGHTLY
Qty: 90 TABLET | Refills: 3 | Status: SHIPPED | OUTPATIENT
Start: 2025-08-20

## 2025-08-20 RX ORDER — INSULIN ASPART 100 [IU]/ML
INJECTION, SOLUTION INTRAVENOUS; SUBCUTANEOUS
Qty: 18 ML | Refills: 11 | Status: SHIPPED | OUTPATIENT
Start: 2025-08-20

## 2025-08-20 RX ORDER — LISINOPRIL 20 MG/1
20 TABLET ORAL DAILY
Qty: 90 TABLET | Refills: 3 | Status: SHIPPED | OUTPATIENT
Start: 2025-08-20

## 2025-08-20 RX ORDER — INSULIN GLARGINE 300 [IU]/ML
60 INJECTION, SOLUTION SUBCUTANEOUS DAILY
Qty: 6 ML | Refills: 11 | Status: SHIPPED | OUTPATIENT
Start: 2025-08-20 | End: 2025-09-19

## 2025-08-20 RX ORDER — DAPAGLIFLOZIN 10 MG/1
10 TABLET, FILM COATED ORAL EVERY MORNING
Qty: 90 TABLET | Refills: 3 | Status: SHIPPED | OUTPATIENT
Start: 2025-08-20

## 2025-08-20 RX ORDER — EZETIMIBE 10 MG/1
10 TABLET ORAL DAILY
Qty: 90 TABLET | Refills: 3 | Status: SHIPPED | OUTPATIENT
Start: 2025-08-20

## 2025-08-22 ENCOUNTER — PATIENT MESSAGE (OUTPATIENT)
Dept: FAMILY MEDICINE | Facility: CLINIC | Age: 75
End: 2025-08-22
Payer: MEDICARE

## 2025-08-26 ENCOUNTER — HOSPITAL ENCOUNTER (OUTPATIENT)
Dept: RADIOLOGY | Facility: HOSPITAL | Age: 75
Discharge: HOME | End: 2025-08-26
Attending: NURSE PRACTITIONER
Payer: MEDICARE

## 2025-08-26 DIAGNOSIS — I87.2 VENOUS STASIS DERMATITIS: ICD-10-CM

## 2025-08-26 DIAGNOSIS — Z79.4 TYPE 2 DIABETES MELLITUS WITH HYPERGLYCEMIA, WITH LONG-TERM CURRENT USE OF INSULIN: ICD-10-CM

## 2025-08-26 DIAGNOSIS — E11.65 TYPE 2 DIABETES MELLITUS WITH HYPERGLYCEMIA, WITH LONG-TERM CURRENT USE OF INSULIN: ICD-10-CM

## 2025-08-26 PROCEDURE — 93925 LOWER EXTREMITY STUDY: CPT | Mod: TC

## 2025-08-26 PROCEDURE — 93970 EXTREMITY STUDY: CPT | Mod: TC

## (undated) DEVICE — SODIUM CHLORIDE 0.9% 1000ML

## (undated) DEVICE — SCALPEL #11 BLADE STRL DISP

## (undated) DEVICE — SPONGE LAP 18X18 PREWASHED

## (undated) DEVICE — NDL HYPODERMIC SAF 25G 1.5IN

## (undated) DEVICE — SWAB AEROBIC CULTURETTE

## (undated) DEVICE — DRAPE DEVON INSTRUMENT 10X16IN

## (undated) DEVICE — ELECTRODE MEGADYNE L-WIRE 33CM

## (undated) DEVICE — APPLIER CLIP EPIX UNIV 5X34

## (undated) DEVICE — TROCAR Z THREAD 12X100 SHIELD

## (undated) DEVICE — HOLDER SCALPEL SURGICAL GOLD

## (undated) DEVICE — KIT CLEANER SCOPE SWAB CLOTH

## (undated) DEVICE — CARTRIDGE BABCOCK GRASPER 5X38

## (undated) DEVICE — SLEEVE KII ADV FIX 5X100MM

## (undated) DEVICE — EVACUATOR WOUND BULB 100CC

## (undated) DEVICE — SET TUB INSUFFLATION SUC 20L

## (undated) DEVICE — SCISSOR CURVED ENDOPATH 5MM

## (undated) DEVICE — RETRACTOR ENDO RETRACT 10MM

## (undated) DEVICE — TROCAR KII FIOS ZTHREAD 11X100

## (undated) DEVICE — IRRIGATOR HYDRO-SURG PLUS 5MM

## (undated) DEVICE — Device

## (undated) DEVICE — MEDIPORE+PAD

## (undated) DEVICE — SYR DISP LL 5CC

## (undated) DEVICE — DISSECTOR EPIX LAPA 5MMX35CM

## (undated) DEVICE — SUT VICRYL+ 27 UR-6 VIOL

## (undated) DEVICE — DRAIN SIL FLT 7MM FULL PERF ST

## (undated) DEVICE — SUT MONOCRYL 4-0 PS-2

## (undated) DEVICE — SLEEVE SCD EXPRESS KNEE LARGE

## (undated) DEVICE — DISSECTOR 5MM ENDOPATH

## (undated) DEVICE — GLOVE PROTEXIS HYDROGEL SZ7.5

## (undated) DEVICE — NDL INSUF ULTRA VERESS 120MM

## (undated) DEVICE — ADHESIVE MASTISOL VIAL 48/BX

## (undated) DEVICE — SOL 9P NACL IRR PIC IL

## (undated) DEVICE — TROCAR ENDO Z THREAD KII 5X100

## (undated) DEVICE — BAG TISSUE RETRIEVAL 5MM